# Patient Record
Sex: MALE | Race: WHITE | NOT HISPANIC OR LATINO | Employment: FULL TIME | URBAN - METROPOLITAN AREA
[De-identification: names, ages, dates, MRNs, and addresses within clinical notes are randomized per-mention and may not be internally consistent; named-entity substitution may affect disease eponyms.]

---

## 2020-02-04 ENCOUNTER — OFFICE VISIT (OUTPATIENT)
Dept: FAMILY MEDICINE CLINIC | Facility: CLINIC | Age: 25
End: 2020-02-04
Payer: COMMERCIAL

## 2020-02-04 VITALS
WEIGHT: 132 LBS | TEMPERATURE: 99.1 F | DIASTOLIC BLOOD PRESSURE: 62 MMHG | RESPIRATION RATE: 16 BRPM | SYSTOLIC BLOOD PRESSURE: 100 MMHG | HEART RATE: 76 BPM | OXYGEN SATURATION: 98 % | HEIGHT: 68 IN | BODY MASS INDEX: 20 KG/M2

## 2020-02-04 DIAGNOSIS — Z11.4 SCREENING FOR HIV (HUMAN IMMUNODEFICIENCY VIRUS): ICD-10-CM

## 2020-02-04 DIAGNOSIS — Z00.00 ANNUAL PHYSICAL EXAM: Primary | ICD-10-CM

## 2020-02-04 DIAGNOSIS — Z23 ENCOUNTER FOR IMMUNIZATION: ICD-10-CM

## 2020-02-04 PROCEDURE — 90471 IMMUNIZATION ADMIN: CPT

## 2020-02-04 PROCEDURE — 90472 IMMUNIZATION ADMIN EACH ADD: CPT

## 2020-02-04 PROCEDURE — 90651 9VHPV VACCINE 2/3 DOSE IM: CPT

## 2020-02-04 PROCEDURE — 3008F BODY MASS INDEX DOCD: CPT | Performed by: FAMILY MEDICINE

## 2020-02-04 PROCEDURE — 90715 TDAP VACCINE 7 YRS/> IM: CPT

## 2020-02-04 PROCEDURE — 99385 PREV VISIT NEW AGE 18-39: CPT | Performed by: FAMILY MEDICINE

## 2020-02-04 RX ORDER — MULTIVITAMIN
1 TABLET ORAL DAILY
COMMUNITY

## 2020-02-04 NOTE — ASSESSMENT & PLAN NOTE
Normal physical exam    Screen for hyperlipidemia as he has had 2 uncles that passed away from Mis  Get fasting lipids  Given tdap and first HPV shot today  Return for nurse visit in 3 months for second and will get 3rd at next annual visit

## 2020-02-04 NOTE — PATIENT INSTRUCTIONS

## 2020-02-04 NOTE — PROGRESS NOTES
ADULT ANNUAL PHYSICAL  [facility]    NAME: Tali Lazo  AGE: 21 y o  SEX: male  : 1995     DATE: 2020     Assessment and Plan:     Problem List Items Addressed This Visit        Other    Annual physical exam - Primary     Normal physical exam    Screen for hyperlipidemia as he has had 2 uncles that passed away from Mis  Get fasting lipids  Given tdap and first HPV shot today  Return for nurse visit in 3 months for second and will get 3rd at next annual visit  Relevant Orders    Lipid panel    Basic metabolic panel      Other Visit Diagnoses     Screening for HIV (human immunodeficiency virus)        Relevant Orders    HIV 1/2 AG-AB combo    Encounter for immunization        Relevant Orders    TDAP VACCINE GREATER THAN OR EQUAL TO 6YO IM    HPV VACCINE 9 VALENT IM          Immunizations and preventive care screenings were discussed with patient today  Appropriate education was printed on patient's after visit summary  Given tdap and hpv #1 today  Counseling:  Alcohol/drug use: discussed moderation in alcohol intake, the recommendations for healthy alcohol use, and avoidance of illicit drug use  Dental Health: discussed importance of regular tooth brushing, flossing, and dental visits  Sexual health: discussed sexually transmitted diseases, partner selection, use of condoms, avoidance of unintended pregnancy, and contraceptive alternatives  · Exercise: the importance of regular exercise/physical activity was discussed  Recommend exercise 3-5 times per week for at least 30 minutes  Return in 1 year (on 2021)  Chief Complaint:     Chief Complaint   Patient presents with    Physical Exam      History of Present Illness:     Adult Annual Physical   Patient here for a comprehensive physical exam  The patient reports no problems      Diet and Physical Activity  · Diet/Nutrition: well balanced diet, frequent junk food and consuming 3-5 servings of fruits/vegetables daily  · Exercise: moderate cardiovascular exercise  Depression Screening  PHQ-9 Depression Screening    PHQ-9:    Frequency of the following problems over the past two weeks:       Little interest or pleasure in doing things:  0 - not at all  Feeling down, depressed, or hopeless:  0 - not at all  PHQ-2 Score:  0       General Health  · Sleep: sleeps well  · Vision: goes for regular eye exams  · Dental: regular dental visits   Health  · History of STDs?: no      Review of Systems:     Review of Systems   Constitutional: Negative for activity change, appetite change, fever and unexpected weight change  HENT: Negative for ear pain, postnasal drip and rhinorrhea  Eyes: Negative for photophobia and pain  Respiratory: Negative for cough, shortness of breath and wheezing  Cardiovascular: Negative for chest pain, palpitations and leg swelling  Gastrointestinal: Negative for abdominal pain, blood in stool, nausea and vomiting  Endocrine: Negative for polydipsia and polyuria  Genitourinary: Negative for difficulty urinating, hematuria and urgency  Musculoskeletal: Negative for myalgias  Skin: Negative for rash  Neurological: Negative for dizziness  Psychiatric/Behavioral: Negative for confusion and sleep disturbance  Past Medical History:     Past Medical History:   Diagnosis Date    Broken bone     collar       Past Surgical History:     History reviewed  No pertinent surgical history     Social History:     Social History     Socioeconomic History    Marital status: Single     Spouse name: None    Number of children: None    Years of education: None    Highest education level: None   Occupational History    None   Social Needs    Financial resource strain: Not hard at all   Lisandro-Patricio insecurity:     Worry: Never true     Inability: Never true   [employer] needs:     Medical: No     Non-medical: No   Tobacco Use    Smoking status: Never Smoker    Smokeless tobacco: Never Used   Substance and Sexual Activity    Alcohol use: Yes     Comment: Social    Drug use: Never    Sexual activity: None   Lifestyle    Physical activity:     Days per week: 3 days     Minutes per session: 60 min    Stress: Not at all   Relationships    Social connections:     Talks on phone: Patient refused     Gets together: Patient refused     Attends Gnosticist service: Patient refused     Active member of club or organization: Patient refused     Attends meetings of clubs or organizations: Patient refused     Relationship status: Patient refused    Intimate partner violence:     Fear of current or ex partner: No     Emotionally abused: No     Physically abused: No     Forced sexual activity: No   Other Topics Concern    None   Social History Narrative    None      Family History:     Family History   Problem Relation Age of Onset    Diabetes Paternal Grandmother     Asthma Paternal Grandmother     Asthma Paternal Aunt       Current Medications:     Current Outpatient Medications   Medication Sig Dispense Refill    Multiple Vitamin (MULTIVITAMIN) tablet Take 1 tablet by mouth daily       No current facility-administered medications for this visit  Allergies:     No Known Allergies   Physical Exam:     /62 (BP Location: Left arm, Patient Position: Sitting, Cuff Size: Adult)   Pulse 76   Temp 99 1 °F (37 3 °C) (Tympanic)   Resp 16   Ht 5' 8" (1 727 m)   Wt 59 9 kg (132 lb)   SpO2 98%   BMI 20 07 kg/m²     Physical Exam   Constitutional: He is oriented to person, place, and time  He appears well-developed and well-nourished  HENT:   Head: Normocephalic and atraumatic  Right Ear: External ear normal    Left Ear: External ear normal    Mouth/Throat: No oropharyngeal exudate  Eyes: Pupils are equal, round, and reactive to light  Conjunctivae and EOM are normal    Neck: Normal range of motion  Neck supple     Cardiovascular: Normal rate, regular rhythm and normal heart sounds  Exam reveals no gallop and no friction rub  No murmur heard  Pulmonary/Chest: Effort normal and breath sounds normal  No respiratory distress  He has no wheezes  He has no rales  He exhibits no tenderness  Abdominal: Soft  Bowel sounds are normal  He exhibits no distension and no mass  There is no tenderness  There is no rebound  Musculoskeletal: Normal range of motion  He exhibits no edema  Neurological: He is alert and oriented to person, place, and time  Skin: Skin is warm and dry  Psychiatric: He has a normal mood and affect         Parminder Santos MD   [address] [place] [address]

## 2020-05-04 ENCOUNTER — CLINICAL SUPPORT (OUTPATIENT)
Dept: FAMILY MEDICINE CLINIC | Facility: CLINIC | Age: 25
End: 2020-05-04
Payer: COMMERCIAL

## 2020-05-04 DIAGNOSIS — Z23 NEED FOR HPV VACCINATION: Primary | ICD-10-CM

## 2020-05-04 PROCEDURE — 90651 9VHPV VACCINE 2/3 DOSE IM: CPT

## 2020-05-04 PROCEDURE — 90471 IMMUNIZATION ADMIN: CPT

## 2020-05-05 ENCOUNTER — APPOINTMENT (OUTPATIENT)
Dept: LAB | Facility: HOSPITAL | Age: 25
End: 2020-05-05
Payer: COMMERCIAL

## 2020-05-05 DIAGNOSIS — Z11.4 SCREENING FOR HIV (HUMAN IMMUNODEFICIENCY VIRUS): ICD-10-CM

## 2020-05-05 DIAGNOSIS — Z00.00 ANNUAL PHYSICAL EXAM: ICD-10-CM

## 2020-05-05 LAB
ANION GAP SERPL CALCULATED.3IONS-SCNC: 5 MMOL/L (ref 4–13)
BUN SERPL-MCNC: 11 MG/DL (ref 5–25)
CALCIUM SERPL-MCNC: 9 MG/DL (ref 8.3–10.1)
CHLORIDE SERPL-SCNC: 106 MMOL/L (ref 100–108)
CHOLEST SERPL-MCNC: 161 MG/DL (ref 50–200)
CO2 SERPL-SCNC: 29 MMOL/L (ref 21–32)
CREAT SERPL-MCNC: 0.91 MG/DL (ref 0.6–1.3)
GFR SERPL CREATININE-BSD FRML MDRD: 117 ML/MIN/1.73SQ M
GLUCOSE P FAST SERPL-MCNC: 91 MG/DL (ref 65–99)
HDLC SERPL-MCNC: 84 MG/DL
LDLC SERPL CALC-MCNC: 66 MG/DL (ref 0–100)
NONHDLC SERPL-MCNC: 77 MG/DL
POTASSIUM SERPL-SCNC: 3.8 MMOL/L (ref 3.5–5.3)
SODIUM SERPL-SCNC: 140 MMOL/L (ref 136–145)
TRIGL SERPL-MCNC: 56 MG/DL

## 2020-05-05 PROCEDURE — 36415 COLL VENOUS BLD VENIPUNCTURE: CPT

## 2020-05-05 PROCEDURE — 87389 HIV-1 AG W/HIV-1&-2 AB AG IA: CPT

## 2020-05-05 PROCEDURE — 80048 BASIC METABOLIC PNL TOTAL CA: CPT

## 2020-05-05 PROCEDURE — 80061 LIPID PANEL: CPT

## 2020-05-06 LAB — HIV 1+2 AB+HIV1 P24 AG SERPL QL IA: NORMAL

## 2021-04-05 DIAGNOSIS — Z23 ENCOUNTER FOR IMMUNIZATION: ICD-10-CM

## 2021-06-23 ENCOUNTER — OFFICE VISIT (OUTPATIENT)
Dept: FAMILY MEDICINE CLINIC | Facility: CLINIC | Age: 26
End: 2021-06-23
Payer: COMMERCIAL

## 2021-06-23 VITALS
RESPIRATION RATE: 16 BRPM | OXYGEN SATURATION: 98 % | BODY MASS INDEX: 21.1 KG/M2 | SYSTOLIC BLOOD PRESSURE: 100 MMHG | HEIGHT: 68 IN | DIASTOLIC BLOOD PRESSURE: 70 MMHG | WEIGHT: 139.2 LBS | HEART RATE: 59 BPM | TEMPERATURE: 99.8 F

## 2021-06-23 DIAGNOSIS — Z23 ENCOUNTER FOR VACCINATION: ICD-10-CM

## 2021-06-23 DIAGNOSIS — Z00.00 ANNUAL PHYSICAL EXAM: Primary | ICD-10-CM

## 2021-06-23 DIAGNOSIS — Z13.1 SCREENING FOR DIABETES MELLITUS (DM): ICD-10-CM

## 2021-06-23 DIAGNOSIS — Z13.0 SCREENING, ANEMIA, DEFICIENCY, IRON: ICD-10-CM

## 2021-06-23 DIAGNOSIS — Z11.59 NEED FOR HEPATITIS C SCREENING TEST: ICD-10-CM

## 2021-06-23 PROCEDURE — 90651 9VHPV VACCINE 2/3 DOSE IM: CPT

## 2021-06-23 PROCEDURE — 3725F SCREEN DEPRESSION PERFORMED: CPT | Performed by: FAMILY MEDICINE

## 2021-06-23 PROCEDURE — 90471 IMMUNIZATION ADMIN: CPT

## 2021-06-23 PROCEDURE — 3008F BODY MASS INDEX DOCD: CPT | Performed by: FAMILY MEDICINE

## 2021-06-23 PROCEDURE — 99395 PREV VISIT EST AGE 18-39: CPT | Performed by: FAMILY MEDICINE

## 2021-06-23 PROCEDURE — 1036F TOBACCO NON-USER: CPT | Performed by: FAMILY MEDICINE

## 2021-06-23 NOTE — PATIENT INSTRUCTIONS

## 2021-06-23 NOTE — PROGRESS NOTES
ADULT ANNUAL PHYSICAL  [facility]    NAME: Krystal Frey  AGE: 31 y o  SEX: male  : 1995     DATE: 2021     Assessment and Plan:     Problem List Items Addressed This Visit        Other    Annual physical exam - Primary     Normal physical exam    Cholesterol and fasting sugars normal    Recheck next year  Last hpv shot given today  Labs ordered for next year  Other Visit Diagnoses     Encounter for vaccination        Relevant Orders    HPV VACCINE 9 VALENT IM    Screening, anemia, deficiency, iron        Relevant Orders    CBC and differential    Screening for diabetes mellitus (DM)        Relevant Orders    Comprehensive metabolic panel    Need for hepatitis C screening test        Relevant Orders    Hepatitis C antibody          Immunizations and preventive care screenings were discussed with patient today  Appropriate education was printed on patient's after visit summary  Completed the maderna covid vaccines  Counseling:  Alcohol/drug use: discussed moderation in alcohol intake, the recommendations for healthy alcohol use, and avoidance of illicit drug use  Dental Health: discussed importance of regular tooth brushing, flossing, and dental visits  · Exercise: the importance of regular exercise/physical activity was discussed  Recommend exercise 3-5 times per week for at least 30 minutes  Return in 1 year (on 2022)  Chief Complaint:     Chief Complaint   Patient presents with    Physical Exam      History of Present Illness:     Adult Annual Physical   Patient here for a comprehensive physical exam  The patient reports no problems  Diet and Physical Activity  · Diet/Nutrition: well balanced diet  · Exercise: vigorous cardiovascular exercise  [place] about 6-7 miles every day         Depression Screening  PHQ-9 Depression Screening    PHQ-9:   Frequency of the following problems over the past two weeks:      Little interest or pleasure in doing things: 0 - not at all  Feeling down, depressed, or hopeless: 0 - not at all  PHQ-2 Score: 0       General Health  · Sleep: sleeps well  · Hearing: no issues  · Vision: wears glasses  · Dental: regular dental visits   Health  · History of STDs?: no        Review of Systems:     Review of Systems   Constitutional: Negative for activity change, appetite change, fever and unexpected weight change  HENT: Negative for ear pain, postnasal drip and rhinorrhea  Eyes: Negative for photophobia and pain  Respiratory: Negative for cough, shortness of breath and wheezing  Cardiovascular: Negative for chest pain, palpitations and leg swelling  Gastrointestinal: Negative for abdominal pain, blood in stool, nausea and vomiting  Endocrine: Negative for polydipsia and polyuria  Genitourinary: Negative for difficulty urinating, hematuria and urgency  Musculoskeletal: Negative for myalgias  Skin: Negative for rash  Neurological: Negative for dizziness  Psychiatric/Behavioral: Negative for confusion and sleep disturbance  Past Medical History:     Past Medical History:   Diagnosis Date    Broken bone     collar       Past Surgical History:     No past surgical history on file     Social History:     Social History     Socioeconomic History    Marital status: Single     Spouse name: None    Number of children: None    Years of education: None    Highest education level: None   Occupational History    None   Tobacco Use    Smoking status: Never Smoker    Smokeless tobacco: Never Used   Substance and Sexual Activity    Alcohol use: Yes     Comment: Social    Drug use: Never    Sexual activity: None   Other Topics Concern    None   Social History Narrative    None     Social Determinants of Health     Financial Resource Strain:     Difficulty of Paying Living Expenses:    Food Insecurity:     Worried About Running Out of Food in the Last Year:    [address] in the Last Year:    Transportation Needs:     Lack of Transportation (Medical):  Lack of Transportation (Non-Medical):    Physical Activity:     Days of Exercise per Week:     Minutes of Exercise per Session:    Stress:     Feeling of Stress :    Social Connections:     Frequency of Communication with Friends and Family:     Frequency of Social Gatherings with Friends and Family:     Attends Episcopal Services:     Active Member of Clubs or Organizations:     Attends Club or Organization Meetings:     Marital Status:    Intimate Partner Violence:     Fear of Current or Ex-Partner:     Emotionally Abused:     Physically Abused:     Sexually Abused:       Family History:     Family History   Problem Relation Age of Onset    Diabetes Paternal Grandmother     Asthma Paternal Grandmother     Asthma Paternal Aunt       Current Medications:     Current Outpatient Medications   Medication Sig Dispense Refill    Multiple Vitamin (MULTIVITAMIN) tablet Take 1 tablet by mouth daily       No current facility-administered medications for this visit  Allergies:     No Known Allergies   Physical Exam:     /70 (BP Location: Left arm, Patient Position: Sitting, Cuff Size: Adult)   Pulse 59   Temp 99 8 °F (37 7 °C) (Tympanic)   Resp 16   Ht 5' 8" (1 727 m)   Wt 63 1 kg (139 lb 3 2 oz)   SpO2 98%   BMI 21 17 kg/m²     Physical Exam  Constitutional:       General: He is not in acute distress  Appearance: He is well-developed  HENT:      Head: Normocephalic and atraumatic  Eyes:      General: No scleral icterus  Conjunctiva/sclera: Conjunctivae normal       Pupils: Pupils are equal, round, and reactive to light  Cardiovascular:      Rate and Rhythm: Normal rate and regular rhythm  Heart sounds: Normal heart sounds  No murmur heard  No friction rub  No gallop  Pulmonary:      Effort: Pulmonary effort is normal  No respiratory distress  Breath sounds: Normal breath sounds  No wheezing or rales  Abdominal:      General: Bowel sounds are normal  There is no distension  Palpations: Abdomen is soft  There is no mass  Tenderness: There is no abdominal tenderness  There is no guarding  Musculoskeletal:         General: No tenderness  Cervical back: Normal range of motion  Skin:     General: Skin is warm and dry  Findings: No rash  Neurological:      Mental Status: He is alert and oriented to person, place, and time  Cranial Nerves: No cranial nerve deficit  Motor: No abnormal muscle tone            Ovidio Hayes MD   [address] [place] [address]

## 2021-06-23 NOTE — ASSESSMENT & PLAN NOTE
Normal physical exam    Cholesterol and fasting sugars normal    Recheck next year  Last hpv shot given today  Labs ordered for next year

## 2022-06-03 ENCOUNTER — ANESTHESIA EVENT (OUTPATIENT)
Dept: PERIOP | Facility: HOSPITAL | Age: 27
End: 2022-06-03
Payer: COMMERCIAL

## 2022-06-03 ENCOUNTER — HOSPITAL ENCOUNTER (OUTPATIENT)
Facility: HOSPITAL | Age: 27
Setting detail: OUTPATIENT SURGERY
Discharge: HOME | End: 2022-06-04
Attending: EMERGENCY MEDICINE | Admitting: SURGERY
Payer: COMMERCIAL

## 2022-06-03 ENCOUNTER — APPOINTMENT (EMERGENCY)
Dept: RADIOLOGY | Facility: HOSPITAL | Age: 27
End: 2022-06-03
Payer: COMMERCIAL

## 2022-06-03 ENCOUNTER — ANESTHESIA (OUTPATIENT)
Dept: PERIOP | Facility: HOSPITAL | Age: 27
End: 2022-06-03
Payer: COMMERCIAL

## 2022-06-03 DIAGNOSIS — K37 APPENDICITIS: Primary | ICD-10-CM

## 2022-06-03 PROBLEM — K35.80 ACUTE APPENDICITIS: Status: ACTIVE | Noted: 2022-06-03

## 2022-06-03 LAB
ALBUMIN SERPL BCP-MCNC: 3.9 G/DL (ref 3.5–5)
ALP SERPL-CCNC: 45 U/L (ref 46–116)
ALT SERPL W P-5'-P-CCNC: 18 U/L (ref 12–78)
ANION GAP SERPL CALCULATED.3IONS-SCNC: 7 MMOL/L (ref 4–13)
AST SERPL W P-5'-P-CCNC: 11 U/L (ref 5–45)
BASOPHILS # BLD AUTO: 0.04 THOUSANDS/ΜL (ref 0–0.1)
BASOPHILS NFR BLD AUTO: 0 % (ref 0–1)
BILIRUB SERPL-MCNC: 0.9 MG/DL (ref 0.2–1)
BILIRUB UR QL STRIP: NEGATIVE
BUN SERPL-MCNC: 8 MG/DL (ref 5–25)
CALCIUM SERPL-MCNC: 8.9 MG/DL (ref 8.3–10.1)
CHLORIDE SERPL-SCNC: 101 MMOL/L (ref 100–108)
CLARITY UR: CLEAR
CO2 SERPL-SCNC: 29 MMOL/L (ref 21–32)
COLOR UR: YELLOW
CREAT SERPL-MCNC: 0.98 MG/DL (ref 0.6–1.3)
EOSINOPHIL # BLD AUTO: 0.19 THOUSAND/ΜL (ref 0–0.61)
EOSINOPHIL NFR BLD AUTO: 2 % (ref 0–6)
ERYTHROCYTE [DISTWIDTH] IN BLOOD BY AUTOMATED COUNT: 13.3 % (ref 11.6–15.1)
GFR SERPL CREATININE-BSD FRML MDRD: 105 ML/MIN/1.73SQ M
GLUCOSE SERPL-MCNC: 110 MG/DL (ref 65–140)
GLUCOSE UR STRIP-MCNC: NEGATIVE MG/DL
HCT VFR BLD AUTO: 44.2 % (ref 36.5–49.3)
HGB BLD-MCNC: 14.6 G/DL (ref 12–17)
HGB UR QL STRIP.AUTO: NEGATIVE
IMM GRANULOCYTES # BLD AUTO: 0.03 THOUSAND/UL (ref 0–0.2)
IMM GRANULOCYTES NFR BLD AUTO: 0 % (ref 0–2)
KETONES UR STRIP-MCNC: ABNORMAL MG/DL
LEUKOCYTE ESTERASE UR QL STRIP: NEGATIVE
LIPASE SERPL-CCNC: 67 U/L (ref 73–393)
LYMPHOCYTES # BLD AUTO: 1.04 THOUSANDS/ΜL (ref 0.6–4.47)
LYMPHOCYTES NFR BLD AUTO: 9 % (ref 14–44)
MCH RBC QN AUTO: 30.2 PG (ref 26.8–34.3)
MCHC RBC AUTO-ENTMCNC: 33 G/DL (ref 31.4–37.4)
MCV RBC AUTO: 91 FL (ref 82–98)
MONOCYTES # BLD AUTO: 1.23 THOUSAND/ΜL (ref 0.17–1.22)
MONOCYTES NFR BLD AUTO: 10 % (ref 4–12)
NEUTROPHILS # BLD AUTO: 9.27 THOUSANDS/ΜL (ref 1.85–7.62)
NEUTS SEG NFR BLD AUTO: 79 % (ref 43–75)
NITRITE UR QL STRIP: NEGATIVE
NRBC BLD AUTO-RTO: 0 /100 WBCS
PH UR STRIP.AUTO: 7.5 [PH] (ref 4.5–8)
PLATELET # BLD AUTO: 188 THOUSANDS/UL (ref 149–390)
PMV BLD AUTO: 9.9 FL (ref 8.9–12.7)
POTASSIUM SERPL-SCNC: 3.8 MMOL/L (ref 3.5–5.3)
PROT SERPL-MCNC: 7.3 G/DL (ref 6.4–8.2)
PROT UR STRIP-MCNC: NEGATIVE MG/DL
RBC # BLD AUTO: 4.84 MILLION/UL (ref 3.88–5.62)
SODIUM SERPL-SCNC: 137 MMOL/L (ref 136–145)
SP GR UR STRIP.AUTO: 1.01 (ref 1–1.03)
UROBILINOGEN UR QL STRIP.AUTO: 0.2 E.U./DL
WBC # BLD AUTO: 11.8 THOUSAND/UL (ref 4.31–10.16)

## 2022-06-03 PROCEDURE — 99220 PR INITIAL OBSERVATION CARE/DAY 70 MINUTES: CPT | Performed by: SURGERY

## 2022-06-03 PROCEDURE — 44970 LAPAROSCOPY APPENDECTOMY: CPT | Performed by: SURGERY

## 2022-06-03 PROCEDURE — 96365 THER/PROPH/DIAG IV INF INIT: CPT

## 2022-06-03 PROCEDURE — G1004 CDSM NDSC: HCPCS

## 2022-06-03 PROCEDURE — 88304 TISSUE EXAM BY PATHOLOGIST: CPT | Performed by: PATHOLOGY

## 2022-06-03 PROCEDURE — 74176 CT ABD & PELVIS W/O CONTRAST: CPT

## 2022-06-03 PROCEDURE — 96375 TX/PRO/DX INJ NEW DRUG ADDON: CPT

## 2022-06-03 PROCEDURE — 99285 EMERGENCY DEPT VISIT HI MDM: CPT

## 2022-06-03 PROCEDURE — 36415 COLL VENOUS BLD VENIPUNCTURE: CPT

## 2022-06-03 PROCEDURE — 81003 URINALYSIS AUTO W/O SCOPE: CPT

## 2022-06-03 PROCEDURE — 83690 ASSAY OF LIPASE: CPT

## 2022-06-03 PROCEDURE — 99285 EMERGENCY DEPT VISIT HI MDM: CPT | Performed by: PHYSICIAN ASSISTANT

## 2022-06-03 PROCEDURE — 96361 HYDRATE IV INFUSION ADD-ON: CPT

## 2022-06-03 PROCEDURE — 85025 COMPLETE CBC W/AUTO DIFF WBC: CPT

## 2022-06-03 PROCEDURE — 80053 COMPREHEN METABOLIC PANEL: CPT

## 2022-06-03 RX ORDER — ONDANSETRON 2 MG/ML
4 INJECTION INTRAMUSCULAR; INTRAVENOUS ONCE
Status: COMPLETED | OUTPATIENT
Start: 2022-06-03 | End: 2022-06-03

## 2022-06-03 RX ORDER — MAGNESIUM HYDROXIDE 1200 MG/15ML
LIQUID ORAL AS NEEDED
Status: DISCONTINUED | OUTPATIENT
Start: 2022-06-03 | End: 2022-06-03 | Stop reason: HOSPADM

## 2022-06-03 RX ORDER — ACETAMINOPHEN 325 MG/1
975 TABLET ORAL EVERY 8 HOURS SCHEDULED
Status: DISCONTINUED | OUTPATIENT
Start: 2022-06-03 | End: 2022-06-04 | Stop reason: HOSPADM

## 2022-06-03 RX ORDER — GLYCOPYRROLATE 0.2 MG/ML
INJECTION INTRAMUSCULAR; INTRAVENOUS AS NEEDED
Status: DISCONTINUED | OUTPATIENT
Start: 2022-06-03 | End: 2022-06-03

## 2022-06-03 RX ORDER — OXYCODONE HYDROCHLORIDE 5 MG/1
5 TABLET ORAL EVERY 4 HOURS PRN
Status: DISCONTINUED | OUTPATIENT
Start: 2022-06-03 | End: 2022-06-03

## 2022-06-03 RX ORDER — METRONIDAZOLE 500 MG/100ML
500 INJECTION, SOLUTION INTRAVENOUS ONCE
Status: COMPLETED | OUTPATIENT
Start: 2022-06-03 | End: 2022-06-03

## 2022-06-03 RX ORDER — METRONIDAZOLE 500 MG/100ML
500 INJECTION, SOLUTION INTRAVENOUS EVERY 8 HOURS
Status: DISCONTINUED | OUTPATIENT
Start: 2022-06-03 | End: 2022-06-03

## 2022-06-03 RX ORDER — LIDOCAINE HYDROCHLORIDE 10 MG/ML
INJECTION, SOLUTION EPIDURAL; INFILTRATION; INTRACAUDAL; PERINEURAL AS NEEDED
Status: DISCONTINUED | OUTPATIENT
Start: 2022-06-03 | End: 2022-06-03

## 2022-06-03 RX ORDER — KETOROLAC TROMETHAMINE 30 MG/ML
INJECTION, SOLUTION INTRAMUSCULAR; INTRAVENOUS AS NEEDED
Status: DISCONTINUED | OUTPATIENT
Start: 2022-06-03 | End: 2022-06-03

## 2022-06-03 RX ORDER — ONDANSETRON 2 MG/ML
4 INJECTION INTRAMUSCULAR; INTRAVENOUS EVERY 6 HOURS PRN
Status: DISCONTINUED | OUTPATIENT
Start: 2022-06-03 | End: 2022-06-03

## 2022-06-03 RX ORDER — MIDAZOLAM HYDROCHLORIDE 2 MG/2ML
INJECTION, SOLUTION INTRAMUSCULAR; INTRAVENOUS AS NEEDED
Status: DISCONTINUED | OUTPATIENT
Start: 2022-06-03 | End: 2022-06-03

## 2022-06-03 RX ORDER — ALBUTEROL SULFATE 2.5 MG/3ML
2.5 SOLUTION RESPIRATORY (INHALATION) ONCE AS NEEDED
Status: DISCONTINUED | OUTPATIENT
Start: 2022-06-03 | End: 2022-06-03 | Stop reason: HOSPADM

## 2022-06-03 RX ORDER — OXYCODONE HYDROCHLORIDE 10 MG/1
10 TABLET ORAL EVERY 4 HOURS PRN
Status: DISCONTINUED | OUTPATIENT
Start: 2022-06-03 | End: 2022-06-04 | Stop reason: HOSPADM

## 2022-06-03 RX ORDER — ONDANSETRON 2 MG/ML
4 INJECTION INTRAMUSCULAR; INTRAVENOUS ONCE AS NEEDED
Status: DISCONTINUED | OUTPATIENT
Start: 2022-06-03 | End: 2022-06-03 | Stop reason: HOSPADM

## 2022-06-03 RX ORDER — ONDANSETRON 2 MG/ML
4 INJECTION INTRAMUSCULAR; INTRAVENOUS EVERY 4 HOURS PRN
Status: DISCONTINUED | OUTPATIENT
Start: 2022-06-03 | End: 2022-06-04 | Stop reason: HOSPADM

## 2022-06-03 RX ORDER — ONDANSETRON 2 MG/ML
INJECTION INTRAMUSCULAR; INTRAVENOUS AS NEEDED
Status: DISCONTINUED | OUTPATIENT
Start: 2022-06-03 | End: 2022-06-03

## 2022-06-03 RX ORDER — SENNOSIDES 8.6 MG
2 TABLET ORAL
Status: DISCONTINUED | OUTPATIENT
Start: 2022-06-04 | End: 2022-06-04 | Stop reason: HOSPADM

## 2022-06-03 RX ORDER — CALCIUM CARBONATE 200(500)MG
1000 TABLET,CHEWABLE ORAL 3 TIMES DAILY PRN
Status: DISCONTINUED | OUTPATIENT
Start: 2022-06-03 | End: 2022-06-04 | Stop reason: HOSPADM

## 2022-06-03 RX ORDER — ENOXAPARIN SODIUM 100 MG/ML
30 INJECTION SUBCUTANEOUS DAILY
Status: DISCONTINUED | OUTPATIENT
Start: 2022-06-03 | End: 2022-06-03

## 2022-06-03 RX ORDER — ROCURONIUM BROMIDE 10 MG/ML
INJECTION, SOLUTION INTRAVENOUS AS NEEDED
Status: DISCONTINUED | OUTPATIENT
Start: 2022-06-03 | End: 2022-06-03

## 2022-06-03 RX ORDER — SODIUM CHLORIDE, SODIUM LACTATE, POTASSIUM CHLORIDE, CALCIUM CHLORIDE 600; 310; 30; 20 MG/100ML; MG/100ML; MG/100ML; MG/100ML
INJECTION, SOLUTION INTRAVENOUS CONTINUOUS PRN
Status: DISCONTINUED | OUTPATIENT
Start: 2022-06-03 | End: 2022-06-03

## 2022-06-03 RX ORDER — SODIUM CHLORIDE, SODIUM LACTATE, POTASSIUM CHLORIDE, CALCIUM CHLORIDE 600; 310; 30; 20 MG/100ML; MG/100ML; MG/100ML; MG/100ML
125 INJECTION, SOLUTION INTRAVENOUS CONTINUOUS
Status: DISCONTINUED | OUTPATIENT
Start: 2022-06-03 | End: 2022-06-04

## 2022-06-03 RX ORDER — DOCUSATE SODIUM 100 MG/1
100 CAPSULE, LIQUID FILLED ORAL 2 TIMES DAILY
Status: DISCONTINUED | OUTPATIENT
Start: 2022-06-04 | End: 2022-06-04 | Stop reason: HOSPADM

## 2022-06-03 RX ORDER — BUPIVACAINE HYDROCHLORIDE AND EPINEPHRINE 2.5; 5 MG/ML; UG/ML
INJECTION, SOLUTION EPIDURAL; INFILTRATION; INTRACAUDAL; PERINEURAL AS NEEDED
Status: DISCONTINUED | OUTPATIENT
Start: 2022-06-03 | End: 2022-06-03 | Stop reason: HOSPADM

## 2022-06-03 RX ORDER — SODIUM CHLORIDE, SODIUM GLUCONATE, SODIUM ACETATE, POTASSIUM CHLORIDE, MAGNESIUM CHLORIDE, SODIUM PHOSPHATE, DIBASIC, AND POTASSIUM PHOSPHATE .53; .5; .37; .037; .03; .012; .00082 G/100ML; G/100ML; G/100ML; G/100ML; G/100ML; G/100ML; G/100ML
125 INJECTION, SOLUTION INTRAVENOUS CONTINUOUS
Status: DISCONTINUED | OUTPATIENT
Start: 2022-06-03 | End: 2022-06-03

## 2022-06-03 RX ORDER — HYDROMORPHONE HCL/PF 1 MG/ML
0.5 SYRINGE (ML) INJECTION
Status: DISCONTINUED | OUTPATIENT
Start: 2022-06-03 | End: 2022-06-04 | Stop reason: HOSPADM

## 2022-06-03 RX ORDER — DEXAMETHASONE SODIUM PHOSPHATE 10 MG/ML
INJECTION, SOLUTION INTRAMUSCULAR; INTRAVENOUS AS NEEDED
Status: DISCONTINUED | OUTPATIENT
Start: 2022-06-03 | End: 2022-06-03

## 2022-06-03 RX ORDER — NEOSTIGMINE METHYLSULFATE 1 MG/ML
INJECTION INTRAVENOUS AS NEEDED
Status: DISCONTINUED | OUTPATIENT
Start: 2022-06-03 | End: 2022-06-03

## 2022-06-03 RX ORDER — CEFAZOLIN SODIUM 1 G/3ML
INJECTION, POWDER, FOR SOLUTION INTRAMUSCULAR; INTRAVENOUS AS NEEDED
Status: DISCONTINUED | OUTPATIENT
Start: 2022-06-03 | End: 2022-06-03

## 2022-06-03 RX ORDER — DIPHENHYDRAMINE HYDROCHLORIDE 50 MG/ML
12.5 INJECTION INTRAMUSCULAR; INTRAVENOUS ONCE AS NEEDED
Status: DISCONTINUED | OUTPATIENT
Start: 2022-06-03 | End: 2022-06-03 | Stop reason: HOSPADM

## 2022-06-03 RX ORDER — CEFOXITIN SODIUM 2 G/50ML
2000 INJECTION, SOLUTION INTRAVENOUS EVERY 6 HOURS
Status: DISCONTINUED | OUTPATIENT
Start: 2022-06-03 | End: 2022-06-03

## 2022-06-03 RX ORDER — DEXMEDETOMIDINE HYDROCHLORIDE 100 UG/ML
INJECTION, SOLUTION INTRAVENOUS AS NEEDED
Status: DISCONTINUED | OUTPATIENT
Start: 2022-06-03 | End: 2022-06-03

## 2022-06-03 RX ORDER — PROPOFOL 10 MG/ML
INJECTION, EMULSION INTRAVENOUS AS NEEDED
Status: DISCONTINUED | OUTPATIENT
Start: 2022-06-03 | End: 2022-06-03

## 2022-06-03 RX ORDER — FENTANYL CITRATE/PF 50 MCG/ML
50 SYRINGE (ML) INJECTION
Status: DISCONTINUED | OUTPATIENT
Start: 2022-06-03 | End: 2022-06-03 | Stop reason: HOSPADM

## 2022-06-03 RX ORDER — ENOXAPARIN SODIUM 100 MG/ML
40 INJECTION SUBCUTANEOUS DAILY
Status: DISCONTINUED | OUTPATIENT
Start: 2022-06-03 | End: 2022-06-04 | Stop reason: HOSPADM

## 2022-06-03 RX ORDER — FENTANYL CITRATE 50 UG/ML
INJECTION, SOLUTION INTRAMUSCULAR; INTRAVENOUS AS NEEDED
Status: DISCONTINUED | OUTPATIENT
Start: 2022-06-03 | End: 2022-06-03

## 2022-06-03 RX ORDER — HYDROMORPHONE HCL/PF 1 MG/ML
0.5 SYRINGE (ML) INJECTION
Status: DISCONTINUED | OUTPATIENT
Start: 2022-06-03 | End: 2022-06-03 | Stop reason: HOSPADM

## 2022-06-03 RX ORDER — OXYCODONE HYDROCHLORIDE 5 MG/1
5 TABLET ORAL EVERY 4 HOURS PRN
Status: DISCONTINUED | OUTPATIENT
Start: 2022-06-03 | End: 2022-06-04 | Stop reason: HOSPADM

## 2022-06-03 RX ORDER — METHOCARBAMOL 750 MG/1
750 TABLET, FILM COATED ORAL EVERY 6 HOURS SCHEDULED
Status: DISCONTINUED | OUTPATIENT
Start: 2022-06-03 | End: 2022-06-04 | Stop reason: HOSPADM

## 2022-06-03 RX ADMIN — PROPOFOL 250 MG: 10 INJECTION, EMULSION INTRAVENOUS at 15:50

## 2022-06-03 RX ADMIN — ROCURONIUM BROMIDE 50 MG: 50 INJECTION INTRAVENOUS at 15:50

## 2022-06-03 RX ADMIN — FENTANYL CITRATE 50 MCG: 50 INJECTION INTRAMUSCULAR; INTRAVENOUS at 16:04

## 2022-06-03 RX ADMIN — DEXMEDETOMIDINE HCL 4 MCG: 100 INJECTION INTRAVENOUS at 16:16

## 2022-06-03 RX ADMIN — ENOXAPARIN SODIUM 40 MG: 40 INJECTION SUBCUTANEOUS at 21:24

## 2022-06-03 RX ADMIN — ACETAMINOPHEN 975 MG: 325 TABLET, FILM COATED ORAL at 21:23

## 2022-06-03 RX ADMIN — SODIUM CHLORIDE, SODIUM LACTATE, POTASSIUM CHLORIDE, AND CALCIUM CHLORIDE: .6; .31; .03; .02 INJECTION, SOLUTION INTRAVENOUS at 16:36

## 2022-06-03 RX ADMIN — NEOSTIGMINE METHYLSULFATE 4 MG: 1 INJECTION INTRAVENOUS at 17:19

## 2022-06-03 RX ADMIN — SODIUM CHLORIDE, SODIUM GLUCONATE, SODIUM ACETATE, POTASSIUM CHLORIDE, MAGNESIUM CHLORIDE, SODIUM PHOSPHATE, DIBASIC, AND POTASSIUM PHOSPHATE 125 ML/HR: .53; .5; .37; .037; .03; .012; .00082 INJECTION, SOLUTION INTRAVENOUS at 11:58

## 2022-06-03 RX ADMIN — DEXAMETHASONE SODIUM PHOSPHATE 10 MG: 10 INJECTION, SOLUTION INTRAMUSCULAR; INTRAVENOUS at 16:02

## 2022-06-03 RX ADMIN — METHOCARBAMOL TABLETS 750 MG: 750 TABLET, COATED ORAL at 11:23

## 2022-06-03 RX ADMIN — LIDOCAINE HYDROCHLORIDE 50 MG: 10 INJECTION, SOLUTION EPIDURAL; INFILTRATION; INTRACAUDAL; PERINEURAL at 15:50

## 2022-06-03 RX ADMIN — DEXTROSE 1000 MG: 50 INJECTION, SOLUTION INTRAVENOUS at 10:54

## 2022-06-03 RX ADMIN — METHOCARBAMOL TABLETS 750 MG: 750 TABLET, COATED ORAL at 21:30

## 2022-06-03 RX ADMIN — MIDAZOLAM 2 MG: 1 INJECTION INTRAMUSCULAR; INTRAVENOUS at 15:40

## 2022-06-03 RX ADMIN — KETOROLAC TROMETHAMINE 30 MG: 30 INJECTION, SOLUTION INTRAMUSCULAR at 17:12

## 2022-06-03 RX ADMIN — SODIUM CHLORIDE 1000 ML: 0.9 INJECTION, SOLUTION INTRAVENOUS at 08:35

## 2022-06-03 RX ADMIN — CEFAZOLIN SODIUM 2000 MG: 1 INJECTION, POWDER, FOR SOLUTION INTRAMUSCULAR; INTRAVENOUS at 15:52

## 2022-06-03 RX ADMIN — METRONIDAZOLE 500 MG: 500 INJECTION, SOLUTION INTRAVENOUS at 11:20

## 2022-06-03 RX ADMIN — DEXMEDETOMIDINE HCL 4 MCG: 100 INJECTION INTRAVENOUS at 16:34

## 2022-06-03 RX ADMIN — ONDANSETRON 4 MG: 2 INJECTION INTRAMUSCULAR; INTRAVENOUS at 17:10

## 2022-06-03 RX ADMIN — FENTANYL CITRATE 50 MCG: 50 INJECTION INTRAMUSCULAR; INTRAVENOUS at 15:50

## 2022-06-03 RX ADMIN — GLYCOPYRROLATE 0.6 MG: 0.2 INJECTION, SOLUTION INTRAMUSCULAR; INTRAVENOUS at 17:19

## 2022-06-03 RX ADMIN — PROPOFOL 50 MG: 10 INJECTION, EMULSION INTRAVENOUS at 15:52

## 2022-06-03 RX ADMIN — PROPOFOL 50 MG: 10 INJECTION, EMULSION INTRAVENOUS at 17:23

## 2022-06-03 RX ADMIN — ONDANSETRON 4 MG: 2 INJECTION INTRAMUSCULAR; INTRAVENOUS at 09:21

## 2022-06-03 NOTE — ANESTHESIA POSTPROCEDURE EVALUATION
Post-Op Assessment Note    CV Status:  Stable  Pain Score: 0    Pain management: adequate     Mental Status:  Alert   Hydration Status:  Stable   PONV Controlled:  None   Airway Patency:  Patent      Post Op Vitals Reviewed: Yes      Staff: CRNA         No complications documented      BP   114/60   Temp   97 4   Pulse  87   Resp   12   SpO2   98

## 2022-06-03 NOTE — H&P
H&P - Surgery  Monika Bruno 31 y o  male MRN: [id number]  Unit/Bed#: ED 14 Encounter: [id number]    Consult / Evaluation   Model of Arrival: Self    Trauma Team: Attending Lio Licea and SKYLER Marshall  Consultants:      Assessment/Plan   Active Problems / Assessment:   Abdominal pain for 24 - 48 hours  Acute appendicitis  Nausea and dry heaves     Plan:   Admit as Observation  OR today for Exp Lap - consent obtained  Pain medication  NPO  Fluids for Hydration    History of Present Illness     Chief Complaint: Abdominal pain initially under umbilicus and now RLQ      HPI:    Monika Bruno is a 31 y o  male who presents with a hitsory of nausea and abdominal pain that started yesterday  Said he woke up, later took a nap and upon getting up started with watery diarrhea for most of the day  Has had nothing by mouth  Got progressively worse and this morning felt as though he could not even get up, intense abdominal pain, especially RLQ  Drove himself to the ER  Review of Systems   Constitutional: Positive for activity change and appetite change  HENT: Negative  Eyes: Negative  Respiratory: Negative  Cardiovascular: Negative  Gastrointestinal: Positive for abdominal pain and nausea  Endocrine: Negative  Genitourinary: Negative  Musculoskeletal: Negative  Skin: Negative  Allergic/Immunologic: Negative  Neurological: Negative  Hematological: Negative  Psychiatric/Behavioral: Negative  12-point, complete review of systems was reviewed and negative except as stated above  Historical Information     Past Medical History:   Diagnosis Date    Broken bone     collar      History reviewed  No pertinent surgical history       Social History     Tobacco Use    Smoking status: Never Smoker    Smokeless tobacco: Never Used   Substance Use Topics    Alcohol use: Yes     Comment: Social    Drug use: Never     Immunization History   Administered Date(s) Administered    H1N1, All Formulations 01/08/2010    HPV9 02/04/2020, 02/04/2020, 05/04/2020, 05/04/2020, 06/23/2021    Tdap 02/04/2020     Last Tetanus: 02/04/2020  Family History: Non-contributory  Admits to occasionally taking Melatonin to help him sleep   No Known Allergies    Objective   Initial Vitals:   Temperature: 97 5 °F (36 4 °C) (06/03/22 0802)  Pulse: 59 (06/03/22 0802)  Respirations: 18 (06/03/22 0802)  Blood Pressure: 159/83 (06/03/22 0802)    Primary Survey:   Airway:        Status: patent;        Pre-hospital Interventions: none        Hospital Interventions: none  Breathing:        Pre-hospital Interventions: none       Effort: normal       Right breath sounds: normal       Left breath sounds: normal  Circulation:        Rhythm: regular       Rate: regular   Right Pulses Left Pulses    R radial: 2+  R femoral: 2+  R pedal: 2+  R carotid: 2+  R popliteal: 2+ L radial: 2+  L femoral: 2+  L pedal: 2+  L carotid: 2+  L popliteal: 2+   Disability:        GCS: Eye: 4; Verbal: 5 Motor: 6 Total: 15       Right Pupil: round;  reactive         Left Pupil:  round;  reactive      R Motor Strength L Motor Strength    R : 5/5  R dorsiflex: 5/5  R plantarflex: 5/5 L : 5/5  L dorsiflex: 5/5  L plantarflex: 5/5        Sensory:  No sensory deficit  Exposure:       Completed: Yes      Secondary Survey:  Physical Exam  Nursing note reviewed  Constitutional:       Appearance: Normal appearance  HENT:      Head: Normocephalic  Nose: Nose normal       Mouth/Throat:      Mouth: Mucous membranes are moist       Pharynx: Oropharynx is clear  Eyes:      Extraocular Movements: Extraocular movements intact  Conjunctiva/sclera: Conjunctivae normal       Pupils: Pupils are equal, round, and reactive to light  Cardiovascular:      Rate and Rhythm: Normal rate and regular rhythm  Pulses: Normal pulses  Heart sounds: Normal heart sounds     Pulmonary:      Effort: Pulmonary effort is normal       Breath sounds: Normal breath sounds  Chest:      Chest wall: No tenderness  Abdominal:      General: Abdomen is flat  Bowel sounds are normal       Tenderness: There is abdominal tenderness  Genitourinary:     Comments: deferred  Musculoskeletal:         General: No tenderness  Normal range of motion  Cervical back: Neck supple  Skin:     General: Skin is warm and dry  Capillary Refill: Capillary refill takes less than 2 seconds  Neurological:      General: No focal deficit present  Mental Status: He is oriented to person, place, and time  Psychiatric:         Mood and Affect: Mood normal          Behavior: Behavior normal          Thought Content:  Thought content normal          Judgment: Judgment normal          Invasive Devices  Report    Peripheral Intravenous Line  Duration           Peripheral IV 06/03/22 Right Antecubital <1 day              Lab Results:    Latest Reference Range & Units 06/03/22 08:12   Sodium 136 - 145 mmol/L 137   Potassium 3 5 - 5 3 mmol/L 3 8   Chloride 100 - 108 mmol/L 101   CO2 21 - 32 mmol/L 29   Anion Gap 4 - 13 mmol/L 7   BUN 5 - 25 mg/dL 8   Creatinine 0 60 - 1 30 mg/dL 0 98 [1]   Glucose, Random 65 - 140 mg/dL 110 [2]   Calcium 8 3 - 10 1 mg/dL 8 9   AST 5 - 45 U/L 11 [3]   ALT 12 - 78 U/L 18 [4]   Alkaline Phosphatase 46 - 116 U/L 45 (L)   Total Protein 6 4 - 8 2 g/dL 7 3   Albumin 3 5 - 5 0 g/dL 3 9   TOTAL BILIRUBIN 0 20 - 1 00 mg/dL 0 90 [5]   eGFR ml/min/1 73sq m 105   Lipase 73 - 393 u/L 67 (L)   WBC 4 31 - 10 16 Thousand/uL 11 80 (H)   Red Blood Cell Count 3 88 - 5 62 Million/uL 4 84   Hemoglobin 12 0 - 17 0 g/dL 14 6   HCT 36 5 - 49 3 % 44 2   MCV 82 - 98 fL 91   MCH 26 8 - 34 3 pg 30 2   MCHC 31 4 - 37 4 g/dL 33 0   RDW 11 6 - 15 1 % 13 3   Platelet Count 422 - 390 Thousands/uL 188   MPV 8 9 - 12 7 fL 9 9   nRBC /100 WBCs 0   (L): Data is abnormally low  (H): Data is abnormally high  [1] Standardized to IDMS reference method  [2] If the patient is fasting, the ADA then defines impaired fasting glucose as > 100 mg/dL and diabetes as > or equal to 123 mg/dL  Specimen collection should occur prior to Sulfasalazine administration due to the potential for falsely depressed results  Specimen collection should occur prior to Sulfapyridine administration due to the potential for falsely elevated results  [3] Specimen collection should occur prior to Sulfasalazine administration due to the potential for falsely depressed results  [4] Specimen collection should occur prior to Sulfasalazine administration due to the potential for falsely depressed results  [5] Use of this assay is not recommended for patients undergoing treatment with eltrombopag due to the potential for falsely elevated results  Imaging Results: I have personally reviewed pertinent reports  Chest Xray(s): none   FAST exam(s): none   CT Scan(s): CT C/A? P - Findings consistent with acute appendicitis  No evidence of perforation or abscess   Additional Xray(s): none     Other Studies: none    Code Status: Level 1 - Full Code  Advance Directive and Living Will:      Power of :    POLST:    I have spent 32 minutes with patient assessing and then discussing a plan of care  Dr Kobe Araya in to assess and then describe surgical intervention  Loco Elliott

## 2022-06-03 NOTE — ED PROVIDER NOTES
History  Chief Complaint   Patient presents with    Abdominal Pain     Pt reports RLQ abd pain X 1 day with nausea and decreased appetite, increase in urinary frequency, urine is "very clear"     Patient presents to the ER for evaluation of abdominal pain x1 day  Patient states that yesterday morning he began with right lower quadrant abdominal pain  States that the pain has significantly worsened over the course the last 24 hours  States the pain is worse with coughing, movement, and retching  Patient notes associated nausea and decreased appetite  States that he feels like he may be going to the bathroom more than normal however denies any difficulty  Patient denies any abdominal trauma  Denies any prior abdominal surgeries  Patient states that he tried to take to [facility] this morning around 3:00 a m  With no relief  Denies any other medication  States that he has not eaten at all today  Patient denies any fevers, headache, dizziness, chest pain, shortness of breath, syncope, vomiting, blood in stool, dysuria, testicular pain, or any other concerning symptoms  Prior to Admission Medications   Prescriptions Last Dose Informant Patient Reported? Taking? Multiple Vitamin (MULTIVITAMIN) tablet  Self Yes No   Sig: Take 1 tablet by mouth daily      Facility-Administered Medications: None       Past Medical History:   Diagnosis Date    Broken bone     collar        History reviewed  No pertinent surgical history  Family History   Problem Relation Age of Onset    Diabetes Paternal Grandmother     Asthma Paternal Grandmother     Asthma Paternal Aunt      I have reviewed and agree with the history as documented      E-Cigarette/Vaping     E-Cigarette/Vaping Substances     Social History     Tobacco Use    Smoking status: Never Smoker    Smokeless tobacco: Never Used   Substance Use Topics    Alcohol use: Yes     Comment: Social    Drug use: Never       Review of Systems Constitutional: Positive for appetite change  Negative for chills and fever  HENT: Negative for congestion and sore throat  Respiratory: Negative for cough and shortness of breath  Cardiovascular: Negative for chest pain  Gastrointestinal: Positive for abdominal pain and nausea  Negative for diarrhea and vomiting  Genitourinary: Negative for dysuria and testicular pain  Musculoskeletal: Negative for back pain  Skin: Negative for rash  Neurological: Negative for headaches  All other systems reviewed and are negative  Physical Exam  Physical Exam  Constitutional:       General: He is not in acute distress  Appearance: He is well-developed  HENT:      Head: Normocephalic and atraumatic  Nose: Nose normal    Eyes:      Conjunctiva/sclera: Conjunctivae normal    Cardiovascular:      Rate and Rhythm: Normal rate  Heart sounds: Normal heart sounds  Pulmonary:      Effort: Pulmonary effort is normal       Breath sounds: Normal breath sounds  Abdominal:      Palpations: Abdomen is soft  Tenderness: There is abdominal tenderness in the right lower quadrant  There is guarding  Positive signs include Rovsing's sign and McBurney's sign  Comments: + peritoneal signs   Genitourinary:     Comments:  exam was performed by Dr Derik Cutler, no acute abnormality  Musculoskeletal:         General: Normal range of motion  Cervical back: Normal range of motion  Skin:     General: Skin is warm  Capillary Refill: Capillary refill takes less than 2 seconds  Neurological:      Mental Status: He is alert and oriented to person, place, and time           Vital Signs  ED Triage Vitals   Temperature Pulse Respirations Blood Pressure SpO2   06/03/22 0802 06/03/22 0802 06/03/22 0802 06/03/22 0802 06/03/22 0802   97 5 °F (36 4 °C) 59 18 159/83 99 %      Temp Source Heart Rate Source Patient Position - Orthostatic VS BP Location FiO2 (%)   06/03/22 0802 06/03/22 0802 06/03/22 8329 06/03/22 0802 --   Oral Monitor Lying Right arm       Pain Score       06/03/22 1518       3           Vitals:    06/03/22 0924 06/03/22 1057 06/03/22 1330 06/03/22 1452   BP: 125/80 126/74 134/76 114/68   Pulse: 60 62 75 57   Patient Position - Orthostatic VS: Sitting Lying Lying Lying         Visual Acuity      ED Medications  Medications   multi-electrolyte (PLASMALYTE-A/ISOLYTE-S PH 7 4) IV solution ( Intravenous Stopped 6/3/22 1636)   acetaminophen (TYLENOL) tablet 975 mg ( Oral Dose Auto Held 6/6/22 2200)   ondansetron (ZOFRAN) injection 4 mg ( Intravenous MAR Hold 6/3/22 1512)   methocarbamol (ROBAXIN) tablet 750 mg ( Oral Dose Auto Held 6/10/22 0600)   oxyCODONE (ROXICODONE) IR tablet 5 mg ( Oral MAR Hold 6/3/22 1512)   senna (SENOKOT) tablet 17 2 mg ( Oral Dose Auto Held 6/7/22 2200)   docusate sodium (COLACE) capsule 100 mg ( Oral Dose Auto Held 6/7/22 1800)   oxyCODONE (ROXICODONE) immediate release tablet 10 mg ( Oral MAR Hold 6/3/22 1512)   HYDROmorphone (DILAUDID) injection 0 5 mg ( Intravenous MAR Hold 6/3/22 1512)   calcium carbonate (TUMS) chewable tablet 1,000 mg ( Oral MAR Hold 6/3/22 1512)   enoxaparin (LOVENOX) subcutaneous injection 40 mg ( Subcutaneous Dose Auto Held 6/6/22 1500)   metroNIDAZOLE (FLAGYL) IVPB (premix) 500 mg 100 mL ( Intravenous Dose Auto Held 6/6/22 1930)   cefTRIAXone (ROCEPHIN) 1,000 mg in dextrose 5 % 50 mL IVPB ( Intravenous Dose Auto Held 6/7/22 1100)   sodium chloride 0 9 % irrigation solution (100 mL Irrigation Given 6/3/22 1615)   sterile water irrigation solution (800 mL Irrigation Given 6/3/22 1615)   sodium chloride 0 9 % bolus 1,000 mL (0 mL Intravenous Stopped 6/3/22 0910)   ondansetron (ZOFRAN) injection 4 mg (4 mg Intravenous Given 6/3/22 0921)   ceftriaxone (ROCEPHIN) 1 g/50 mL in dextrose IVPB (0 mg Intravenous Stopped 6/3/22 1120)   metroNIDAZOLE (FLAGYL) IVPB (premix) 500 mg 100 mL (0 mg Intravenous Stopped 6/3/22 1151)       Diagnostic Studies  Results Reviewed     Procedure Component Value Units Date/Time    Urine Macroscopic, POC [[id number]]  (Abnormal) Collected: 06/03/22 1200    Lab Status: Final result Specimen: Urine Updated: 06/03/22 1202     Color, UA Yellow     Clarity, UA Clear     pH, UA 7 5     Leukocytes, UA Negative     Nitrite, UA Negative     Protein, UA Negative mg/dl      Glucose, UA Negative mg/dl      Ketones, UA Trace mg/dl      Urobilinogen, UA 0 2 E U /dl      Bilirubin, UA Negative     Blood, UA Negative     Specific Gravity, UA 1 015    Narrative:      CLINITEK RESULT    Comprehensive metabolic panel [[id number]]  (Abnormal) Collected: 06/03/22 0812    Lab Status: Final result Specimen: Blood from Arm, Right Updated: 06/03/22 0837     Sodium 137 mmol/L      Potassium 3 8 mmol/L      Chloride 101 mmol/L      CO2 29 mmol/L      ANION GAP 7 mmol/L      BUN 8 mg/dL      Creatinine 0 98 mg/dL      Glucose 110 mg/dL      Calcium 8 9 mg/dL      AST 11 U/L      ALT 18 U/L      Alkaline Phosphatase 45 U/L      Total Protein 7 3 g/dL      Albumin 3 9 g/dL      Total Bilirubin 0 90 mg/dL      eGFR 105 ml/min/1 73sq m     Narrative:      [place] guidelines for Chronic Kidney Disease (CKD):     Stage 1 with normal or high GFR (GFR > 90 mL/min/1 73 square meters)    Stage 2 Mild CKD (GFR = 60-89 mL/min/1 73 square meters)    Stage 3A Moderate CKD (GFR = 45-59 mL/min/1 73 square meters)    Stage 3B Moderate CKD (GFR = 30-44 mL/min/1 73 square meters)    Stage 4 Severe CKD (GFR = 15-29 mL/min/1 73 square meters)    Stage 5 End Stage CKD (GFR <15 mL/min/1 73 square meters)  Note: GFR calculation is accurate only with a steady state creatinine    Lipase [[id number]]  (Abnormal) Collected: 06/03/22 0812    Lab Status: Final result Specimen: Blood from Arm, Right Updated: 06/03/22 0837     Lipase 67 u/L     CBC and differential [[id number]]  (Abnormal) Collected: 06/03/22 0812    Lab Status: Final result Specimen: Blood from Arm, Right Updated: 06/03/22 0819     WBC 11 80 Thousand/uL      RBC 4 84 Million/uL      Hemoglobin 14 6 g/dL      Hematocrit 44 2 %      MCV 91 fL      MCH 30 2 pg      MCHC 33 0 g/dL      RDW 13 3 %      MPV 9 9 fL      Platelets 850 Thousands/uL      nRBC 0 /100 WBCs      Neutrophils Relative 79 %      Immat GRANS % 0 %      Lymphocytes Relative 9 %      Monocytes Relative 10 %      Eosinophils Relative 2 %      Basophils Relative 0 %      Neutrophils Absolute 9 27 Thousands/µL      Immature Grans Absolute 0 03 Thousand/uL      Lymphocytes Absolute 1 04 Thousands/µL      Monocytes Absolute 1 23 Thousand/µL      Eosinophils Absolute 0 19 Thousand/µL      Basophils Absolute 0 04 Thousands/µL                  CT abdomen pelvis wo contrast   Final Result by Marietta Swain MD (06/03 1034)      Findings consistent with acute appendicitis  No evidence of perforation or abscess  I personally discussed this study with Rajni Collado on 6/3/2022 at 10:34 AM                      Workstation performed: [id number]                    Procedures  Procedures         ED Course  ED Course as of 06/03/22 1655   Fri Jun 03, 2022   0825 Blood Pressure: 159/83   0825 Temperature: 97 5 °F (36 4 °C)   0825 Pulse: 59   0825 Respirations: 18   0825 SpO2: 99 %   0825 WBC(!): 11 80   0825 Hemoglobin: 14 6   0825 Given contrast shortage, Reviewed with radiology, Dr Aline Landa, states can try CT scan with oral contrast, without IV first however if appendix not identified and still have high suspicion for appendicitis, approval to repeat CT scan with IV contrast given  1436 Dr Javier Clayton saw patient in ED for  exam  Normal  exam  Kady Inoue likely appendicitis  STates he would not wait for oral contrast at this time as to not delay possible OR   1041 CT abdomen pelvis wo contrast  IMPRESSION:     Findings consistent with acute appendicitis  No evidence of perforation or abscess     2400 [facility] surgery called, consult placed                                             MDM     Patient well appearing and nontoxic in the ER  Examination very concerning for acute appendicitis  Labs and CT ordered  CT scan shows evidence of uncomplicated appendicitis  Patient was given antibiotics in the ER and surgery was consulted  Surgery saw patient at bedside in the ED and will take to OR later today  Patient stable throughout ER stay  Disposition  Final diagnoses:   Appendicitis     Time reflects when diagnosis was documented in both MDM as applicable and the Disposition within this note     Time User Action Codes Description Comment    6/3/2022 10:42 AM Horacio Asif Appendicitis       ED Disposition     None      Follow-up Information    None         Current Discharge Medication List      CONTINUE these medications which have NOT CHANGED    Details   Multiple Vitamin (MULTIVITAMIN) tablet Take 1 tablet by mouth daily             No discharge procedures on file      PDMP Review     None          ED Provider  Electronically Signed by           Clare Gerber PA-C  06/04/22 5733

## 2022-06-03 NOTE — ED NOTES
Pt reports increased nausea, unable to give urine sample at this time     Dae Chowdhury RN  06/03/22 7223

## 2022-06-03 NOTE — PLAN OF CARE
Problem: PAIN - ADULT  Goal: Verbalizes/displays adequate comfort level or baseline comfort level  Description: Interventions:  - Encourage patient to monitor pain and request assistance  - Assess pain using appropriate pain scale  - Administer analgesics based on type and severity of pain and evaluate response  - Implement non-pharmacological measures as appropriate and evaluate response  - Consider cultural and social influences on pain and pain management  - Notify physician/advanced practitioner if interventions unsuccessful or patient reports new pain  Outcome: Progressing     Problem: INFECTION - ADULT  Goal: Absence or prevention of progression during hospitalization  Description: INTERVENTIONS:  - Assess and monitor for signs and symptoms of infection  - Monitor lab/diagnostic results  - Monitor all insertion sites, i e  indwelling lines, tubes, and drains  - Monitor endotracheal if appropriate and nasal secretions for changes in amount and color  - [place] appropriate cooling/warming therapies per order  - Administer medications as ordered  - Instruct and encourage patient and family to use good hand hygiene technique  - Identify and instruct in appropriate isolation precautions for identified infection/condition  Outcome: Progressing  Goal: Absence of fever/infection during neutropenic period  Description: INTERVENTIONS:  - Monitor WBC    Outcome: Progressing     Problem: DISCHARGE PLANNING  Goal: Discharge to home or other facility with appropriate resources  Description: INTERVENTIONS:  - Identify barriers to discharge w/patient and caregiver  - Arrange for needed discharge resources and transportation as appropriate  - Identify discharge learning needs (meds, wound care, etc )  - Arrange for interpretive services to assist at discharge as needed  - Refer to Case Management Department for coordinating discharge planning if the patient needs post-hospital services based on physician/advanced practitioner order or complex needs related to functional status, cognitive ability, or social support system  Outcome: Progressing     Problem: Knowledge Deficit  Goal: Patient/family/caregiver demonstrates understanding of disease process, treatment plan, medications, and discharge instructions  Description: Complete learning assessment and assess knowledge base    Interventions:  - Provide teaching at level of understanding  - Provide teaching via preferred learning methods  Outcome: Progressing

## 2022-06-03 NOTE — OP NOTE
OPERATIVE REPORT  PATIENT NAME: Alejandro Lewis    :  1995  MRN: [id number]  Pt Location: [facility] OR ROOM 06    SURGERY DATE: 6/3/2022    Surgeon(s) and Role:     * Babak Pena DO - Primary     * Robb Barbosa MD - Assisting     * Kirby Samson MD - Assisting    Preop Diagnosis:  Appendicitis [K37]    Post-Op Diagnosis Codes:     * Appendicitis [K37]    Procedure(s) (LRB):  APPENDECTOMY LAPAROSCOPIC (N/A)    Specimen(s):  ID Type Source Tests Collected by Time Destination   1 :  Tissue Appendix TISSUE EXAM Babak Pena DO 6/3/2022 1630        Estimated Blood Loss:   Minimal    Drains:  Urethral Catheter Latex 16 Fr  (Active)   Site Assessment Patent 22 1550   Collection Container Standard drainage bag 22 1550   Securement Method Other (Comment) 22 1550   Number of days: 0       Anesthesia Type:   General    Operative Indications:  Appendicitis [K37]    Operative Findings:  Acute uncomplicated appendicitis    Complications:   None    Procedure and Technique: This is a 31year old male who presents with acute appendicitis in need of appendectomy  All risks, benefits, and alternatives were discussed with the patient who agreed with the plan of care  Patient was identified by armband and verbal communication and brought back to the operating room  He was induced by general anesthesia via endotracheal intubation  A Jang catheter was inserted to decompress the bladder  His abdomen was prepped with ChloraPrep and draped in usual sterile fashion  A time-out was called to review the procedure and its details  Antibiotics were administered  An infraumbilical midline incision was made and carried down to fascia, which was grasped with kochers and elevated prior to being divided under direct vision  This defect was used to insert a 5 mm port, which was subsequently utilized to establish our pneumoperitoneum  The laparoscope was then inserted into the abdomen    No injuries or abnormalities were seen  Subsequently the following ports were inserted under direct visualization along with local anesthetic in the typical fashion:  A left lower quadrant 5 mm port and a 5 mm suprapubic port  The patient was placed in Trendelenburg position with left side down  The peritoneal cavity was inspected and and acute simple appendicitis was noted in the right lower quadrant  We began our dissection by following the tenia of the cecum to the base of the appendix  The appendix was easily identified  The peritoneum of the inflamed mesoappendix was divided using a combination of blunt dissection and Harmonic scalpel  The mesoappendix was identified and similarly divided  The base of the cecum was inspected and found to be intact  3 endoloops were applied to the base of the appendix and subsequently the appendix was divided sharply with laparoscopic scissors proximal to the most distal endoloop  The suction  was introduced into the abdomen to remove blood and reactive ascites from the right lower quadrant and pelvis  The mesoappendix was again exposed and hemostasis appeared excellent  The appendix was placed in a sterile endoscopic bag and removed from the LLQ incision  Specimens were sent to pathology  All ports were removed under direct vision  Skin incisions were closed using 4 0 Monocryl sutures in interrupted subcuticular fashion, followed by sterile skin glue  Patient was then woken from general anesthesia and brought to PACU in stable condition  All instrument, needle, and sponge counts were correct at the conclusion of the case  Radiofrequency scanning was negative  Dr Madyson Alvarez was present for the entirety of the operation      Patient Disposition:  PACU       SIGNATURE: Raoul Couch MD  DATE: Allison 3, 2022  TIME: 5:21 PM

## 2022-06-03 NOTE — ANESTHESIA PREPROCEDURE EVALUATION
Procedure:  APPENDECTOMY LAPAROSCOPIC (N/A Abdomen)    Relevant Problems   No relevant active problems        Physical Exam    Airway    Mallampati score: I  TM Distance: >3 FB  Neck ROM: full     Dental   No notable dental hx     Cardiovascular      Pulmonary      Other Findings        Anesthesia Plan  ASA Score- 1     Anesthesia Type- general with ASA Monitors  Additional Monitors:   Airway Plan: ETT  Plan Factors-Exercise tolerance (METS): >4 METS  Chart reviewed  Existing labs reviewed  Patient summary reviewed  Patient is not a current smoker  Obstructive sleep apnea risk education given perioperatively  Induction- intravenous and rapid sequence induction  Postoperative Plan- Plan for postoperative opioid use  Planned trial extubation    Informed Consent- Anesthetic plan and risks discussed with patient  I personally reviewed this patient with the CRNA  Discussed and agreed on the Anesthesia Plan with the CRNA  Barby Kelsey

## 2022-06-03 NOTE — ED ATTENDING ATTESTATION
6/3/2022  I, Sara Garcias MD, saw and evaluated the patient  I have discussed the patient with the resident/non-physician practitioner and agree with the resident's/non-physician practitioner's findings, Plan of Care, and MDM as documented in the resident's/non-physician practitioner's note, except where noted  All available labs and Radiology studies were reviewed  I was present for key portions of any procedure(s) performed by the resident/non-physician practitioner and I was immediately available to provide assistance  At this point I agree with the current assessment done in the Emergency Department  I have conducted an independent evaluation of this patient a history and physical is as follows:    ED Course     60-year-old male presents with abdominal pain x1 day  Patient describes pain as periumbilical migraine to right lower quadrant associated symptoms include nausea vomiting and anorexia  Patient denies any other complaints at this time denies fevers or chills  Last bowel movement was yesterday  Vital signs reviewed  Abdomen is soft with point tenderness over McBurney's point  Guarding present   exam:  Testes normal lie    Impression:  60-year-old male right lower quadrant pain history presentation concerning for appendicitis  Plan will check lab CTAP pain control antiemetics as needed  CT imaging remarkable for acute appendicitis    Surgical evaluation dispo per surgery recs      Critical Care Time  Procedures

## 2022-06-04 VITALS
WEIGHT: 130 LBS | SYSTOLIC BLOOD PRESSURE: 120 MMHG | DIASTOLIC BLOOD PRESSURE: 79 MMHG | OXYGEN SATURATION: 98 % | BODY MASS INDEX: 19.26 KG/M2 | HEIGHT: 69 IN | HEART RATE: 58 BPM | TEMPERATURE: 97.6 F | RESPIRATION RATE: 16 BRPM

## 2022-06-04 PROCEDURE — NC001 PR NO CHARGE: Performed by: SURGERY

## 2022-06-04 PROCEDURE — 99024 POSTOP FOLLOW-UP VISIT: CPT | Performed by: SURGERY

## 2022-06-04 RX ORDER — METHOCARBAMOL 750 MG/1
750 TABLET, FILM COATED ORAL EVERY 6 HOURS SCHEDULED
Qty: 25 TABLET | Refills: 0 | Status: SHIPPED | OUTPATIENT
Start: 2022-06-04 | End: 2022-06-11

## 2022-06-04 RX ORDER — IBUPROFEN 800 MG/1
800 TABLET ORAL EVERY 8 HOURS PRN
Qty: 30 TABLET | Refills: 0 | Status: SHIPPED | OUTPATIENT
Start: 2022-06-04 | End: 2022-06-18

## 2022-06-04 RX ADMIN — ACETAMINOPHEN 975 MG: 325 TABLET, FILM COATED ORAL at 06:22

## 2022-06-04 RX ADMIN — METHOCARBAMOL TABLETS 750 MG: 750 TABLET, COATED ORAL at 06:22

## 2022-06-04 NOTE — PROGRESS NOTES
Progress Note - [facility]   Author  31 y o  male MRN: [id number]  Unit/Bed#: -01 Encounter: [id number]    Assessment:  30 yo male with acute uncomplicated appendicitis, POD1 laparoscopic appendectomy  Afebrile, Stable VS on room air  Plan:  - Pain and nausea control PRN  - Regular diet  - DVT ppx  - Anticipate discharge today    Subjective/Objective     Subjective:   No acute events overnight  Pain well controlled  Denies nausea, vomiting, fever, chills, SOB, chest pain  Making urine  Normal appetite  Objective:     Blood pressure 111/67, pulse 66, temperature 97 7 °F (36 5 °C), resp  rate 17, height 5' 9" (1 753 m), weight 59 kg (130 lb), SpO2 99 %  ,Body mass index is 19 2 kg/m²  Intake/Output Summary (Last 24 hours) at 6/4/2022 6764  Last data filed at 6/3/2022 1751  Gross per 24 hour   Intake 2650 ml   Output 200 ml   Net 2450 ml       Invasive Devices  Report    Peripheral Intravenous Line  Duration           Peripheral IV 06/03/22 Right Antecubital <1 day                Physical Exam:  Gen: NAD, Comfortable  Neuro: A&O  Head: Normal Cephalic, Atraumatic  Eye: No scleral icterus  Neck: No JVD, Midline trachea  CV: Regular rate, Cap refill <2 sec  Pulm: Normal work of breathing, no respiratory distress  Abd: Soft, Non-Distended, Appropriately tender by incisions: umbilical, LLQ, suprapubic  Incisions clean, dry, intact   Ecchymosis (6 5cm x 6cm) surrounding umbilical incision   Ext: No edema, Non-tender  Skin: Warm, dry, intact    ---  Makayla Shells, M4

## 2022-06-04 NOTE — DISCHARGE INSTRUCTIONS
Acute Care Surgery Discharge Instructions    Please follow-up as instructed  Activity:  - No lifting greater than 20 pounds or strenuous physical activity or exercise for at least 2 weeks  - Walking and normal light activities are encouraged  - Normal daily activities including climbing steps are okay  - No driving until no longer using pain medications  Diet:    - You may resume your normal diet  Wound Care:  - Your incisions were closed with absorbable suture and covered with a special surgical glue  - Do NOT pick or peel the glue  It will come off on its own when the incision under it has healed in 1-2 weeks  - You may shower and let soapy water run over your incisions, then gently dab dry  Do NOT scrub  No tub baths or swimming until cleared by your surgeon   - Wash incision gently with soap and water and pat dry  - Do not apply any creams or ointments unless instructed to do so by your surgeon   - Jere Tyson may apply ice as needed (no longer than 20 minutes an hour) for the first 48 hours  - Bruising is not unusual and will go away with a little time  You may apply a warm, moist compress that may help the bruising resolve quicker  - You may remove the dressings the day after surgery (unless otherwise instructed)  Leave any skin tapes (steri-strips) on the incision(s) in place until they fall off on their own  Any new dressings are optional     Medications:    -Tylenol, Ibuprofen, and robaxin for pain  - You should continue your current medication regimen after discharge unless otherwise instructed  Please refer to your discharge medication list for further details  - Please take the pain medications as directed      Additional Instructions:  - If you have any questions or concerns after discharge please call the office   - Call office or return to ER if fever greater than 101, chills, persistent nausea/vomiting, worsening/uncontrollable pain, and/or increasing redness or purulent/foul smelling drainage from incision(s)

## 2022-06-04 NOTE — DISCHARGE SUMMARY
Discharge Summary    Rj Ram 31 y o  male MRN: [id number]    Unit/Bed#: -99 Encounter: [id number]    Admission Date: 6/3/2022     Discharge Date:/6/4/2022    Attending: Dr Awais Stevens    Consultants: None    Admitting Diagnosis: Appendicitis [K37]  Abdominal pain in male [R10 9]    Principle Diagnosis: Acute appendicitis    Secondary Diagnosis:  Past Medical History:   Diagnosis Date    Broken bone     collar      History reviewed  No pertinent surgical history  Procedures Performed: Procedure(s):  APPENDECTOMY LAPAROSCOPIC    Imaging:Procedure: CT abdomen pelvis wo contrast    Result Date: 6/3/2022  Narrative: CT ABDOMEN AND PELVIS WITHOUT IV CONTRAST INDICATION:   RLQ abdominal pain (Age >= 14y) RLQ pain, concern for appy  COMPARISON:  None  TECHNIQUE:  CT examination of the abdomen and pelvis was performed without intravenous contrast  This examination was performed without intravenous contrast in the context of the critical nationwide Omnipaque shortage  Axial, sagittal, and coronal 2D reformatted images were created from the source data and submitted for interpretation  Radiation dose length product (DLP) for this visit:  316 35 mGy-cm   This examination, like all CT scans performed in the [facility], was performed utilizing techniques to minimize radiation dose exposure, including the use of iterative  reconstruction and automated exposure control  Enteric contrast was administered  FINDINGS: ABDOMEN LOWER CHEST:  No clinically significant abnormality identified in the visualized lower chest  LIVER/BILIARY TREE:  Unremarkable  GALLBLADDER:  No calcified gallstones  No pericholecystic inflammatory change  SPLEEN:  Unremarkable  PANCREAS:  Unremarkable  ADRENAL GLANDS:  Unremarkable  KIDNEYS/URETERS:  Unremarkable  No hydronephrosis  STOMACH AND BOWEL:  Unremarkable  APPENDIX:  Dilated appendix is identified measuring 18 mm    Extensive surrounding periappendiceal inflammatory change is identified in keeping with acute appendicitis  ABDOMINOPELVIC CAVITY:  No ascites  No pneumoperitoneum  No lymphadenopathy  VESSELS:  Unremarkable for patient's age  PELVIS REPRODUCTIVE ORGANS:  Unremarkable for patient's age  URINARY BLADDER:  Unremarkable  ABDOMINAL WALL/INGUINAL REGIONS:  Unremarkable  OSSEOUS STRUCTURES:  No acute fracture or destructive osseous lesion  Impression: Findings consistent with acute appendicitis  No evidence of perforation or abscess  I personally discussed this study with Shelli Her on 6/3/2022 at 10:34 AM  Workstation performed: [id number]       Discharge Medications:  See after visit summary for reconciled discharge medications provided to patient and family  Brief HPI (per H/P): Per  Vera Andujar, "Rosalie Purcell is a 31 y o  male who presents with a hitsory of nausea and abdominal pain that started yesterday  Said he woke up, later took a nap and upon getting up started with watery diarrhea for most of the day  Has had nothing by mouth  Got progressively worse and this morning felt as though he could not even get up, intense abdominal pain, especially RLQ  Drove himself to the ER "       Hospital Course: Rosalie Purcell is a 31 y o  male who presented 6/3/2022 for above procedure  Intraoperative findings included the following from operative report:acute uncomplicated appendicitis  The patient hospital course was uncomplicated  Patient did well  On POD1 was tolerating a diet, pain was well controlled, and urinating appropriately  Patient was discharged on HD1/POD1  On the day of discharge, the patient was voiding spontaneously, ambulating at baseline, and pain was well controlled  The patient was sent home with prescriptions for pain, robaxin and ibuprofen  He understood all instructions for discharge  He was also given the names and numbers of the providers as well as instructions for follow up appointments       Condition at Discharge: good     Provisions for Follow-Up Care:  See after visit summary for information related to follow-up care and any pertinent home health orders  Disposition: See After Visit Summary for discharge disposition information  Discharge instructions/Information to patient and family:   See after visit summary for information provided to patient and family  Planned Readmission: No    Discharge Statement   I spent 15 minutes discharging the patient  This time was spent on the day of discharge  I had direct contact with the patient on the day of discharge  Additional documentation is required if more than 30 minutes were spent on discharge

## 2022-06-04 NOTE — PLAN OF CARE
Problem: PAIN - ADULT  Goal: Verbalizes/displays adequate comfort level or baseline comfort level  Description: Interventions:  - Encourage patient to monitor pain and request assistance  - Assess pain using appropriate pain scale  - Administer analgesics based on type and severity of pain and evaluate response  - Implement non-pharmacological measures as appropriate and evaluate response  - Consider cultural and social influences on pain and pain management  - Notify physician/advanced practitioner if interventions unsuccessful or patient reports new pain  Outcome: Progressing     Problem: INFECTION - ADULT  Goal: Absence or prevention of progression during hospitalization  Description: INTERVENTIONS:  - Assess and monitor for signs and symptoms of infection  - Monitor lab/diagnostic results  - Monitor all insertion sites, i e  indwelling lines, tubes, and drains  - Monitor endotracheal if appropriate and nasal secretions for changes in amount and color  - [place] appropriate cooling/warming therapies per order  - Administer medications as ordered  - Instruct and encourage patient and family to use good hand hygiene technique  - Identify and instruct in appropriate isolation precautions for identified infection/condition  Outcome: Progressing     Problem: DISCHARGE PLANNING  Goal: Discharge to home or other facility with appropriate resources  Description: INTERVENTIONS:  - Identify barriers to discharge w/patient and caregiver  - Arrange for needed discharge resources and transportation as appropriate  - Identify discharge learning needs (meds, wound care, etc )  - Arrange for interpretive services to assist at discharge as needed  - Refer to Case Management Department for coordinating discharge planning if the patient needs post-hospital services based on physician/advanced practitioner order or complex needs related to functional status, cognitive ability, or social support system  Outcome: Progressing Problem: Knowledge Deficit  Goal: Patient/family/caregiver demonstrates understanding of disease process, treatment plan, medications, and discharge instructions  Description: Complete learning assessment and assess knowledge base    Interventions:  - Provide teaching at level of understanding  - Provide teaching via preferred learning methods  Outcome: Progressing

## 2022-06-04 NOTE — QUICK NOTE
Post-Op Check - Red Surgery   Ashlie Oreilly 31 y o  male MRN: [id number]  Unit/Bed#: -01 Encounter: [id number]    Assessment:   32 yo male with acute uncomplicated appendicitis, s/p laparoscopic appendectomy today  Vitals stable on room air  Pain is adequately controlled  Patient denies nausea, vomiting, fever, chills, SOB, chest pain  Passing urine and flatus  Reports normal appetite  Plan:  - Pain and nausea control PRN  - Regular diet  -   - DVT ppx  - Anticipate discharge tomorrow     Physical Exam:   Gen: NAD, Comfortable  Neuro: A&O, No focal deficits  Head: Normal Cephalic, Atraumatic  Neck: No JVD, Midline trachea  CV: Regular rate, cap refill <2 sec  Pulm: Normal work of breathing, no respiratory distress  Abd: Soft, Minimally distended, Appropriately tender   Umbilical, LLQ, and suprapubic incisions clean, dry, intact  Ext: No edema, Non-tender  Skin: Warm, dry, intact    ---  Mariah Fernandez, M4

## 2022-06-07 ENCOUNTER — TRANSITIONAL CARE MANAGEMENT (OUTPATIENT)
Dept: FAMILY MEDICINE CLINIC | Facility: CLINIC | Age: 27
End: 2022-06-07

## 2022-06-16 ENCOUNTER — OFFICE VISIT (OUTPATIENT)
Dept: SURGERY | Facility: CLINIC | Age: 27
End: 2022-06-16

## 2022-06-16 VITALS — BODY MASS INDEX: 19.73 KG/M2 | WEIGHT: 133.2 LBS | TEMPERATURE: 97.7 F | HEIGHT: 69 IN

## 2022-06-16 DIAGNOSIS — K35.32 PERFORATED APPENDIX: ICD-10-CM

## 2022-06-16 DIAGNOSIS — Z09 POSTOP CHECK: ICD-10-CM

## 2022-06-16 PROCEDURE — 99024 POSTOP FOLLOW-UP VISIT: CPT | Performed by: SURGERY

## 2022-06-16 NOTE — PROGRESS NOTES
Office Visit - General Surgery  Ashley Dia MRN: [id number]  Encounter: [id number]    Assessment and Plan  Problem List Items Addressed This Visit    None         Chief Complaint:  Ashley Dia is a 31 y o  male who presents for Post-op (P/o appendectomy )    Subjective      Past Medical History:   Diagnosis Date    Broken bone     collar        Past Surgical History:   Procedure Laterality Date    APPENDECTOMY LAPAROSCOPIC N/A 6/3/2022    Procedure: APPENDECTOMY LAPAROSCOPIC;  Surgeon: Mimi Shirley DO;  Location: [facility];  Service: General       Family History   Problem Relation Age of Onset    Diabetes Paternal Grandmother     Asthma Paternal Grandmother     Asthma Paternal Aunt        Social History     Tobacco Use    Smoking status: Never Smoker    Smokeless tobacco: Never Used   Substance Use Topics    Alcohol use: Yes     Comment: Social    Drug use: Never        Medications  Current Outpatient Medications on File Prior to Visit   Medication Sig Dispense Refill    ibuprofen (MOTRIN) 800 mg tablet Take 1 tablet (800 mg total) by mouth every 8 (eight) hours as needed for moderate pain for up to 14 days 30 tablet 0    Multiple Vitamin (MULTIVITAMIN) tablet Take 1 tablet by mouth daily      methocarbamol (ROBAXIN) 750 mg tablet Take 1 tablet (750 mg total) by mouth every 6 (six) hours for 25 doses 25 tablet 0     No current facility-administered medications on file prior to visit         Allergies  No Known Allergies    Review of Systems    Objective  Vitals:    06/16/22 1547   Temp: 97 7 °F (36 5 °C)       Physical Exam

## 2022-06-27 ENCOUNTER — OFFICE VISIT (OUTPATIENT)
Dept: FAMILY MEDICINE CLINIC | Facility: CLINIC | Age: 27
End: 2022-06-27
Payer: COMMERCIAL

## 2022-06-27 VITALS
WEIGHT: 136.6 LBS | HEIGHT: 69 IN | DIASTOLIC BLOOD PRESSURE: 80 MMHG | OXYGEN SATURATION: 99 % | HEART RATE: 62 BPM | RESPIRATION RATE: 16 BRPM | SYSTOLIC BLOOD PRESSURE: 110 MMHG | BODY MASS INDEX: 20.23 KG/M2 | TEMPERATURE: 98.9 F

## 2022-06-27 DIAGNOSIS — Z13.1 SCREENING FOR DIABETES MELLITUS (DM): ICD-10-CM

## 2022-06-27 DIAGNOSIS — Z13.0 SCREENING FOR DEFICIENCY ANEMIA: ICD-10-CM

## 2022-06-27 DIAGNOSIS — Z00.00 ANNUAL PHYSICAL EXAM: Primary | ICD-10-CM

## 2022-06-27 DIAGNOSIS — Z11.59 NEED FOR HEPATITIS C SCREENING TEST: ICD-10-CM

## 2022-06-27 PROBLEM — K35.80 ACUTE APPENDICITIS: Status: RESOLVED | Noted: 2022-06-03 | Resolved: 2022-06-27

## 2022-06-27 PROCEDURE — 1036F TOBACCO NON-USER: CPT | Performed by: FAMILY MEDICINE

## 2022-06-27 PROCEDURE — 99395 PREV VISIT EST AGE 18-39: CPT | Performed by: FAMILY MEDICINE

## 2022-06-27 PROCEDURE — 3725F SCREEN DEPRESSION PERFORMED: CPT | Performed by: FAMILY MEDICINE

## 2022-06-27 PROCEDURE — 3008F BODY MASS INDEX DOCD: CPT | Performed by: FAMILY MEDICINE

## 2022-06-27 NOTE — PROGRESS NOTES
ADULT ANNUAL PHYSICAL  [facility]    NAME: Thomas Sherwood  AGE: 31 y o  SEX: male  : 1995     DATE: 2022     Assessment and Plan:     Problem List Items Addressed This Visit        Other    Annual physical exam - Primary     Normal exam    Lipids normal at 2020  Screening labs ordered for next year  UTD on vaccines  No FH of colon cancer  Other Visit Diagnoses     Screening for deficiency anemia        Relevant Orders    CBC and differential    Screening for diabetes mellitus (DM)        Relevant Orders    Comprehensive metabolic panel    Need for hepatitis C screening test        Relevant Orders    Hepatitis C antibody          Immunizations and preventive care screenings were discussed with patient today  Appropriate education was printed on patient's after visit summary  Vaccines UTD  Counseling:  Alcohol/drug use: discussed moderation in alcohol intake, the recommendations for healthy alcohol use, and avoidance of illicit drug use  Dental Health: discussed importance of regular tooth brushing, flossing, and dental visits  Exercise: the importance of regular exercise/physical activity was discussed  Recommend exercise 3-5 times per week for at least 30 minutes  Return in about 1 year (around 2023)  Chief Complaint:     Chief Complaint   Patient presents with    Physical Exam      History of Present Illness:     Adult Annual Physical   Patient here for a comprehensive physical exam  The patient reports no problems  Diet and Physical Activity  Diet/Nutrition: well balanced diet  Exercise: vigorous cardiovascular exercise        Depression Screening  PHQ-2/9 Depression Screening    Little interest or pleasure in doing things: 0 - not at all  Feeling down, depressed, or hopeless: 1 - several days  PHQ-2 Score: 1  PHQ-2 Interpretation: Negative depression screen       General Health  Sleep: sleeps well    Hearing: no issues  Vision: goes for regular eye exams and wears glasses  Dental: regular dental visits   Health  History of STDs?: no      Review of Systems:     Review of Systems   Constitutional: Negative for activity change, appetite change, fever and unexpected weight change  HENT: Negative for ear pain, postnasal drip and rhinorrhea  Eyes: Negative for photophobia and pain  Respiratory: Negative for cough, shortness of breath and wheezing  Cardiovascular: Negative for chest pain, palpitations and leg swelling  Gastrointestinal: Negative for abdominal pain, blood in stool, nausea and vomiting  Endocrine: Negative for polydipsia and polyuria  Genitourinary: Negative for difficulty urinating, hematuria and urgency  Musculoskeletal: Negative for myalgias  Skin: Negative for rash  Neurological: Negative for dizziness  Psychiatric/Behavioral: Negative for confusion and sleep disturbance        Past Medical History:     Past Medical History:   Diagnosis Date    Broken bone     collar       Past Surgical History:     Past Surgical History:   Procedure Laterality Date    APPENDECTOMY LAPAROSCOPIC N/A 6/3/2022    Procedure: APPENDECTOMY LAPAROSCOPIC;  Surgeon: Savi Hernandes DO;  Location: [facility];  Service: General      Social History:     Social History     Socioeconomic History    Marital status: Single     Spouse name: None    Number of children: None    Years of education: None    Highest education level: None   Occupational History    None   Tobacco Use    Smoking status: Never Smoker    Smokeless tobacco: Never Used   Substance and Sexual Activity    Alcohol use: Yes     Comment: Social    Drug use: Never    Sexual activity: None   Other Topics Concern    None   Social History Narrative    None     Social Determinants of Health     Financial Resource Strain: Not on file   Food Insecurity: Not on file   Transportation Needs: Not on file Physical Activity: Not on file   Stress: Not on file   Social Connections: Not on file   Intimate Partner Violence: Not on file   Housing Stability: Not on file      Family History:     Family History   Problem Relation Age of Onset    Diabetes Paternal Grandmother     Asthma Paternal Grandmother     Asthma Paternal Aunt       Current Medications:     Current Outpatient Medications   Medication Sig Dispense Refill    Multiple Vitamin (MULTIVITAMIN) tablet Take 1 tablet by mouth daily      ibuprofen (MOTRIN) 800 mg tablet Take 1 tablet (800 mg total) by mouth every 8 (eight) hours as needed for moderate pain for up to 14 days 30 tablet 0    methocarbamol (ROBAXIN) 750 mg tablet Take 1 tablet (750 mg total) by mouth every 6 (six) hours for 25 doses 25 tablet 0     No current facility-administered medications for this visit  Allergies:     No Known Allergies   Physical Exam:     /80 (BP Location: Left arm, Patient Position: Sitting, Cuff Size: Standard)   Pulse 62   Temp 98 9 °F (37 2 °C) (Tympanic)   Resp 16   Ht 5' 9" (1 753 m)   Wt 62 kg (136 lb 9 6 oz)   SpO2 99%   BMI 20 17 kg/m²     Physical Exam  Constitutional:       General: He is not in acute distress  Appearance: He is well-developed  HENT:      Head: Normocephalic and atraumatic  Eyes:      General: No scleral icterus  Conjunctiva/sclera: Conjunctivae normal       Pupils: Pupils are equal, round, and reactive to light  Cardiovascular:      Rate and Rhythm: Normal rate and regular rhythm  Heart sounds: Normal heart sounds  No murmur heard  No friction rub  No gallop  Pulmonary:      Effort: Pulmonary effort is normal  No respiratory distress  Breath sounds: Normal breath sounds  No wheezing or rales  Abdominal:      General: Bowel sounds are normal  There is no distension  Palpations: Abdomen is soft  There is no mass  Tenderness: There is no abdominal tenderness  There is no guarding  Comments: Surgical sites clean and well healed    Musculoskeletal:         General: No tenderness  Cervical back: Normal range of motion  Skin:     General: Skin is warm and dry  Findings: No rash  Neurological:      Mental Status: He is alert and oriented to person, place, and time  Cranial Nerves: No cranial nerve deficit  Motor: No abnormal muscle tone            Huong Locke MD   [address] [place] [address]

## 2022-06-27 NOTE — PATIENT INSTRUCTIONS

## 2022-06-27 NOTE — ASSESSMENT & PLAN NOTE
Normal exam    Lipids normal at 2020  Screening labs ordered for next year  UTD on vaccines  No FH of colon cancer

## 2023-06-12 ENCOUNTER — HOSPITAL ENCOUNTER (EMERGENCY)
Facility: HOSPITAL | Age: 28
Discharge: HOME | End: 2023-06-12
Attending: EMERGENCY MEDICINE
Payer: COMMERCIAL

## 2023-06-12 VITALS
DIASTOLIC BLOOD PRESSURE: 87 MMHG | OXYGEN SATURATION: 98 % | SYSTOLIC BLOOD PRESSURE: 130 MMHG | TEMPERATURE: 97.8 F | RESPIRATION RATE: 18 BRPM | HEART RATE: 98 BPM

## 2023-06-12 DIAGNOSIS — R03.0 ELEVATED BLOOD PRESSURE READING: ICD-10-CM

## 2023-06-12 DIAGNOSIS — F10.929 ACUTE ALCOHOL INTOXICATION (HCC): Primary | ICD-10-CM

## 2023-06-12 PROCEDURE — 99284 EMERGENCY DEPT VISIT MOD MDM: CPT

## 2023-06-12 NOTE — ED PROVIDER NOTES
History  Chief Complaint   Patient presents with   • Alcohol Intoxication     Presents to ER with EMS and police after being found sleeping [place] bridge +etoh patient has no complaints at this time      Patient is a 50-year-old male, with no pertinent medical history, who presents to the ED today, via EMS and police, after being [place] bridge. Patient states that he does not make a habit of drinking regularly but that he was socially drinking tonight and drank an unknown quantity of beer. Patient denies drinking any "funny" alcohols or alcohol is not from a alcohol distributor and he also denies any drug use. Patient denies any known trauma, headache, chest pain, dyspnea, abdominal pain, weakness, numbness, vision change, dysphagia, difficulty walking, other concerns at this time. Patient states that he feels well and that he wants to go home. Patient believes that he tried to walk home and tired out. Prior to Admission Medications   Prescriptions Last Dose Informant Patient Reported? Taking?    Multiple Vitamin (MULTIVITAMIN) tablet  Self Yes No   Sig: Take 1 tablet by mouth daily   ibuprofen (MOTRIN) 800 mg tablet   No No   Sig: Take 1 tablet (800 mg total) by mouth every 8 (eight) hours as needed for moderate pain for up to 14 days   methocarbamol (ROBAXIN) 750 mg tablet   No No   Sig: Take 1 tablet (750 mg total) by mouth every 6 (six) hours for 25 doses      Facility-Administered Medications: None       Past Medical History:   Diagnosis Date   • Broken bone     collar        Past Surgical History:   Procedure Laterality Date   • APPENDECTOMY LAPAROSCOPIC N/A 6/3/2022    Procedure: APPENDECTOMY LAPAROSCOPIC;  Surgeon: Patricia Braxton DO;  Location: [facility];  Service: General       Family History   Problem Relation Age of Onset   • Diabetes Paternal Grandmother    • Asthma Paternal Grandmother    • Asthma Paternal Aunt      I have reviewed and agree with the history as documented. E-Cigarette/Vaping     E-Cigarette/Vaping Substances     Social History     Tobacco Use   • Smoking status: Never   • Smokeless tobacco: Never   Substance Use Topics   • Alcohol use: Yes     Comment: Social   • Drug use: Never        Review of Systems   Constitutional: Negative for fever. HENT: Negative for trouble swallowing. Eyes: Negative for visual disturbance. Respiratory: Negative for shortness of breath. Cardiovascular: Negative for chest pain. Gastrointestinal: Negative for abdominal pain. Endocrine: Negative for polyuria. Genitourinary: Negative for dysuria. Musculoskeletal: Negative for gait problem. Skin: Negative for rash. Allergic/Immunologic: Negative for environmental allergies. Neurological: Negative for weakness and numbness. Psychiatric/Behavioral: Negative for confusion. All other systems reviewed and are negative. Physical Exam  ED Triage Vitals [06/12/23 0048]   Temperature Pulse Respirations Blood Pressure SpO2   97.8 °F (36.6 °C) 98 18 130/87 98 %      Temp Source Heart Rate Source Patient Position - Orthostatic VS BP Location FiO2 (%)   Oral Monitor Sitting Left arm --      Pain Score       --             Orthostatic Vital Signs  Vitals:    06/12/23 0048   BP: 130/87   Pulse: 98   Patient Position - Orthostatic VS: Sitting       Physical Exam  Vitals and nursing note reviewed. Constitutional:       General: He is not in acute distress. Appearance: Normal appearance. He is not ill-appearing, toxic-appearing or diaphoretic. Comments: Patient appears comfortable during my evaluation    HENT:      Head: Normocephalic and atraumatic. Right Ear: External ear normal.      Left Ear: External ear normal.      Nose: Nose normal. No rhinorrhea. Mouth/Throat:      Mouth: Mucous membranes are moist.      Pharynx: Oropharynx is clear. No oropharyngeal exudate or posterior oropharyngeal erythema. Comments: Uvula midline.  No oropharyngeal or submandibular mass/swelling  Eyes:      General: No scleral icterus. Right eye: No discharge. Left eye: No discharge. Extraocular Movements: Extraocular movements intact. Conjunctiva/sclera: Conjunctivae normal.      Pupils: Pupils are equal, round, and reactive to light. Cardiovascular:      Rate and Rhythm: Normal rate and regular rhythm. Pulses: Normal pulses. Heart sounds: Normal heart sounds. No murmur heard. No friction rub. No gallop. Comments: 2+ Radial  Pulmonary:      Effort: Pulmonary effort is normal. No respiratory distress. Breath sounds: Normal breath sounds. No stridor. No wheezing, rhonchi or rales. Abdominal:      General: Abdomen is flat. There is no distension. Palpations: Abdomen is soft. Tenderness: There is no abdominal tenderness. There is no right CVA tenderness, left CVA tenderness, guarding or rebound. Musculoskeletal:         General: No tenderness or deformity. Cervical back: Normal range of motion and neck supple. No rigidity or tenderness. No muscular tenderness. Comments: No tenderness to palpation of the bilateral shoulders, elbows, arms, thighs, knees, legs. No chest wall or pelvic tenderness to palpation     No midline C, T, L spine tenderness to palpation    Lymphadenopathy:      Cervical: No cervical adenopathy. Skin:     General: Skin is warm and dry. Capillary Refill: Capillary refill takes less than 2 seconds. Neurological:      Mental Status: He is alert. Comments: Cranial nerves 2-12 intact. 5/5 strength in all four extremities including finger extension against resistance. Sensation to light touch subjectively intact/equal in all four extremities and the face. Patient able to ambulate without difficulty. Patient is speaking clearly in complete sentences. Patient is answering appropriately and able to follow commands. No speech slurring.    Psychiatric: Mood and Affect: Mood normal.         Behavior: Behavior normal.         ED Medications  Medications - No data to display    Diagnostic Studies  Results Reviewed     None                 No orders to display         Procedures  Procedures      ED Course                                       Medical Decision Making  Patient is a 26-year-old male, with no pertinent medical history, who presents to the ED today, via EMS and police, after being [place] sleeping [place] bridge. Patient states that he does not make a habit of drinking regularly but that he was socially drinking tonight and drank an unknown quantity of beer. Patient denies drinking any "funny" alcohols or alcohol is not from a alcohol distributor and he also denies any drug use. Patient denies any known trauma, headache, chest pain, dyspnea, abdominal pain, weakness, numbness, vision change, dysphagia, difficulty walking, other concerns at this time. Patient states that he feels well and that he wants to go home. Patient believes that he tried to walk home and tired out. Patient is currently afebrile and hemodynamically stable. His physical exam is notable for no external signs of trauma, no speech slurring, ability to ambulate in a straight line, clear heart lungs, soft nontender abdomen, no tenderness to palpation throughout the body. This presentation is concerning for: Acute alcohol intoxication. Low clinical suspicion for occult trauma/coingestion at this time based on history and physical exam.  Patient attempting to call friends and family for a ride. - No language barrier.   - History obtained from patient. - There are no limitations to the history obtained. - External record review performed of previous charting was performed by me. - Patient's medication regimen reviewed. - Patient's comorbidities factored into diagnosis considerations and disposition planning.            Disposition  Final diagnoses:   Acute alcohol intoxication ([address])   Elevated blood pressure reading     Time reflects when diagnosis was documented in both MDM as applicable and the Disposition within this note     Time User Action Codes Description Comment    6/12/2023  1:03 AM [employer] A Add [F10.929] Acute alcohol intoxication ([address])     6/12/2023  1:03 AM [employer] A Add [R03.0] Elevated blood pressure reading       ED Disposition     ED Disposition   Discharge    Condition   Stable    Date/Time   Mon Jun 12, 2023  1:16 AM    Comment   Fernando Favorcecil discharge to home/self care. Follow-up Information     Follow up With Specialties Details Why [address], [place] Family Medicine Schedule an appointment as soon as possible for a visit   [phone]  [address]  [phone]            Current Discharge Medication List      CONTINUE these medications which have NOT CHANGED    Details   ibuprofen (MOTRIN) 800 mg tablet Take 1 tablet (800 mg total) by mouth every 8 (eight) hours as needed for moderate pain for up to 14 days  Qty: 30 tablet, Refills: 0    Associated Diagnoses: Appendicitis      methocarbamol (ROBAXIN) 750 mg tablet Take 1 tablet (750 mg total) by mouth every 6 (six) hours for 25 doses  Qty: 25 tablet, Refills: 0    Associated Diagnoses: Appendicitis      Multiple Vitamin (MULTIVITAMIN) tablet Take 1 tablet by mouth daily           No discharge procedures on file. PDMP Review     None           ED Provider  Attending physically available and evaluated Fernando Luongcecil. I managed the patient along with the ED Attending.     Electronically Signed by         Maria D eJesus Stewart MD  06/12/23 6520

## 2023-06-12 NOTE — DISCHARGE INSTRUCTIONS
You were evaluated in the emergency department for: alcohol intoxication. You can access your current and pending results through [facility] [place]. A radiologist will take a second look at your X-Rays, if you had any, and you will be contacted with any new findings. You should follow-up with your primary care provider, as soon as possible, for re-evaluation. If you do not have a primary care provider, I have referred you to [facility]. You will be contacted about scheduling an appointment. Their phone number is also included on this paperwork. Your workup revealed no emergent features at this time; however, many disease processes are dynamic:    Please, return to the emergency department if you experience new or worsening symptoms, fever, chest pain, shortness of breath, difficulty breathing, dizziness, abdominal pain, persistent nausea/vomiting, syncope or passing out, blood in your urine or stool, coughing up blood, leg swelling/pain, urinary retention, bowel or bladder incontinence, numbness between your legs. Additionally, your blood pressure was measured to be high. This is something that you should discuss with your primary care provider and have re-checked within one week. You should not drive until sober.

## 2023-06-12 NOTE — ED ATTENDING ATTESTATION
6/12/2023  INiko MD, saw and evaluated the patient. I have discussed the patient with the resident/non-physician practitioner and agree with the resident's/non-physician practitioner's findings, Plan of Care, and MDM as documented in the resident's/non-physician practitioner's note, except where noted. All available labs and Radiology studies were reviewed. I was present for key portions of any procedure(s) performed by the resident/non-physician practitioner and I was immediately available to provide assistance. At this point I agree with the current assessment done in the Emergency Department. I have conducted an independent evaluation of this patient a history and physical is as follows:    ED Course  ED Course as of 06/12/23 0137   Mon Jun 12, 2023   0106 Per resident h&p 25 YO M presents for evaluation of alcohol intoxication O: gait NL I/P alcohol intoxication     Emergency Department Note- Beny Umanzor 28 y.o. male MRN: [id number]    Unit/Bed#: [facility]on Encounter: [id number]    Beny Umanzor is a 28 y.o. male who presents with   Chief Complaint   Patient presents with   • Alcohol Intoxication     Presents to ER with EMS and police after being found sleeping outside under bridge +etoh patient has no complaints at this time          History of Present Illness   HPI:  Beny Umanzor is a 28 y.o. male who presents for evaluation of:  Alcohol intoxication. Patient was brought in by EMS after he was found sleeping outside underneath a bridge. Patient notes that he was drinking alcohol earlier this evening. Patient denies other illicit drug ingestions. Patient denies any intent to harm himself or others. Patient states that he just became tired and laid down on his way home from the bar. Review of Systems   Constitutional: Negative for fatigue and fever. HENT: Negative for congestion and sore throat. Respiratory: Negative for cough and shortness of breath.     Cardiovascular: Negative for chest pain and palpitations. Gastrointestinal: Negative for abdominal pain and nausea. Genitourinary: Negative for flank pain and frequency. Neurological: Negative for light-headedness and headaches. Psychiatric/Behavioral: Negative for dysphoric mood and hallucinations. All other systems reviewed and are negative. Historical Information   Past Medical History:   Diagnosis Date   • Broken bone     collar      Past Surgical History:   Procedure Laterality Date   • APPENDECTOMY LAPAROSCOPIC N/A 6/3/2022    Procedure: APPENDECTOMY LAPAROSCOPIC;  Surgeon: Selene Cruz DO;  Location: [facility];  Service: General     Social History   Social History     Substance and Sexual Activity   Alcohol Use Yes    Comment: Social     Social History     Substance and Sexual Activity   Drug Use Never     Social History     Tobacco Use   Smoking Status Never   Smokeless Tobacco Never     Family History:   Family History   Problem Relation Age of Onset   • Diabetes Paternal Grandmother    • Asthma Paternal Grandmother    • Asthma Paternal Aunt        Meds/Allergies   PTA meds:   Prior to Admission Medications   Prescriptions Last Dose Informant Patient Reported? Taking?    Multiple Vitamin (MULTIVITAMIN) tablet  Self Yes No   Sig: Take 1 tablet by mouth daily   ibuprofen (MOTRIN) 800 mg tablet   No No   Sig: Take 1 tablet (800 mg total) by mouth every 8 (eight) hours as needed for moderate pain for up to 14 days   methocarbamol (ROBAXIN) 750 mg tablet   No No   Sig: Take 1 tablet (750 mg total) by mouth every 6 (six) hours for 25 doses      Facility-Administered Medications: None     No Known Allergies    Objective   First Vitals:   Blood Pressure: 130/87 (06/12/23 0048)  Pulse: 98 (06/12/23 0048)  Temperature: 97.8 °F (36.6 °C) (06/12/23 0048)  Temp Source: Oral (06/12/23 0048)  Respirations: 18 (06/12/23 0048)  SpO2: 98 % (06/12/23 0048)    Current Vitals:   Blood Pressure: 130/87 (06/12/23 0048)  Pulse: 98 (23)  Temperature: 97.8 °F (36.6 °C) (23)  Temp Source: Oral (23)  Respirations: 18 (23)  SpO2: 98 % (23)    No intake or output data in the 24 hours ending 23 0137    Invasive Devices     None                 Physical Exam  Vitals and nursing note reviewed. Constitutional:       General: He is not in acute distress. Appearance: Normal appearance. He is well-developed. Comments: Intoxicated male, calm, cooperative. HENT:      Head: Normocephalic and atraumatic. Right Ear: External ear normal.      Left Ear: External ear normal.      Nose: Nose normal.      Mouth/Throat:      Pharynx: No oropharyngeal exudate. Eyes:      Conjunctiva/sclera: Conjunctivae normal.      Pupils: Pupils are equal, round, and reactive to light. Cardiovascular:      Rate and Rhythm: Normal rate and regular rhythm. Pulmonary:      Effort: Pulmonary effort is normal. No respiratory distress. Abdominal:      General: Abdomen is flat. There is no distension. Palpations: Abdomen is soft. Musculoskeletal:         General: No deformity. Normal range of motion. Cervical back: Normal range of motion and neck supple. Skin:     General: Skin is warm and dry. Capillary Refill: Capillary refill takes less than 2 seconds. Neurological:      General: No focal deficit present. Mental Status: He is alert and oriented to person, place, and time. Mental status is at baseline. Coordination: Coordination normal.   Psychiatric:         Mood and Affect: Mood normal.         Behavior: Behavior normal.         Thought Content: Thought content normal.         Judgment: Judgment normal.           Medical Decision Makin. Acute alcohol intoxication: Observe until sober    No results found for this or any previous visit (from the past 36 hour(s)).   No orders to display         Portions of the record may have been created with voice recognition software. Occasional wrong word or "sound a like" substitutions may have occurred due to the inherent limitations of voice recognition software. Read the chart carefully and recognize, using context, where substitutions have occurred.         Critical Care Time  Procedures

## 2023-08-22 ENCOUNTER — APPOINTMENT (OUTPATIENT)
Dept: LAB | Facility: CLINIC | Age: 28
End: 2023-08-22
Payer: COMMERCIAL

## 2023-08-22 DIAGNOSIS — Z13.0 SCREENING FOR DEFICIENCY ANEMIA: ICD-10-CM

## 2023-08-22 DIAGNOSIS — Z11.59 NEED FOR HEPATITIS C SCREENING TEST: ICD-10-CM

## 2023-08-22 DIAGNOSIS — Z13.1 SCREENING FOR DIABETES MELLITUS (DM): ICD-10-CM

## 2023-08-22 LAB
ALBUMIN SERPL BCP-MCNC: 4.5 G/DL (ref 3.5–5)
ALP SERPL-CCNC: 34 U/L (ref 34–104)
ALT SERPL W P-5'-P-CCNC: 13 U/L (ref 7–52)
ANION GAP SERPL CALCULATED.3IONS-SCNC: 6 MMOL/L
AST SERPL W P-5'-P-CCNC: 15 U/L (ref 13–39)
BASOPHILS # BLD AUTO: 0.03 THOUSANDS/ÂΜL (ref 0–0.1)
BASOPHILS NFR BLD AUTO: 1 % (ref 0–1)
BILIRUB SERPL-MCNC: 1.32 MG/DL (ref 0.2–1)
BUN SERPL-MCNC: 11 MG/DL (ref 5–25)
CALCIUM SERPL-MCNC: 9 MG/DL (ref 8.4–10.2)
CHLORIDE SERPL-SCNC: 99 MMOL/L (ref 96–108)
CO2 SERPL-SCNC: 32 MMOL/L (ref 21–32)
CREAT SERPL-MCNC: 0.85 MG/DL (ref 0.6–1.3)
EOSINOPHIL # BLD AUTO: 0.12 THOUSAND/ÂΜL (ref 0–0.61)
EOSINOPHIL NFR BLD AUTO: 3 % (ref 0–6)
ERYTHROCYTE [DISTWIDTH] IN BLOOD BY AUTOMATED COUNT: 13.1 % (ref 11.6–15.1)
GFR SERPL CREATININE-BSD FRML MDRD: 118 ML/MIN/1.73SQ M
GLUCOSE SERPL-MCNC: 119 MG/DL (ref 65–140)
HCT VFR BLD AUTO: 45.7 % (ref 36.5–49.3)
HCV AB SER QL: NORMAL
HGB BLD-MCNC: 15.2 G/DL (ref 12–17)
IMM GRANULOCYTES # BLD AUTO: 0.01 THOUSAND/UL (ref 0–0.2)
IMM GRANULOCYTES NFR BLD AUTO: 0 % (ref 0–2)
LYMPHOCYTES # BLD AUTO: 0.94 THOUSANDS/ÂΜL (ref 0.6–4.47)
LYMPHOCYTES NFR BLD AUTO: 23 % (ref 14–44)
MCH RBC QN AUTO: 29.8 PG (ref 26.8–34.3)
MCHC RBC AUTO-ENTMCNC: 33.3 G/DL (ref 31.4–37.4)
MCV RBC AUTO: 90 FL (ref 82–98)
MONOCYTES # BLD AUTO: 0.41 THOUSAND/ÂΜL (ref 0.17–1.22)
MONOCYTES NFR BLD AUTO: 10 % (ref 4–12)
NEUTROPHILS # BLD AUTO: 2.52 THOUSANDS/ÂΜL (ref 1.85–7.62)
NEUTS SEG NFR BLD AUTO: 63 % (ref 43–75)
NRBC BLD AUTO-RTO: 0 /100 WBCS
PLATELET # BLD AUTO: 211 THOUSANDS/UL (ref 149–390)
PMV BLD AUTO: 10.8 FL (ref 8.9–12.7)
POTASSIUM SERPL-SCNC: 3.7 MMOL/L (ref 3.5–5.3)
PROT SERPL-MCNC: 7.2 G/DL (ref 6.4–8.4)
RBC # BLD AUTO: 5.1 MILLION/UL (ref 3.88–5.62)
SODIUM SERPL-SCNC: 137 MMOL/L (ref 135–147)
WBC # BLD AUTO: 4.03 THOUSAND/UL (ref 4.31–10.16)

## 2023-08-22 PROCEDURE — 86803 HEPATITIS C AB TEST: CPT

## 2023-08-22 PROCEDURE — 80053 COMPREHEN METABOLIC PANEL: CPT

## 2023-08-22 PROCEDURE — 36415 COLL VENOUS BLD VENIPUNCTURE: CPT

## 2023-08-22 PROCEDURE — 85025 COMPLETE CBC W/AUTO DIFF WBC: CPT

## 2023-08-23 ENCOUNTER — OFFICE VISIT (OUTPATIENT)
Dept: FAMILY MEDICINE CLINIC | Facility: CLINIC | Age: 28
End: 2023-08-23
Payer: COMMERCIAL

## 2023-08-23 VITALS
OXYGEN SATURATION: 98 % | WEIGHT: 137 LBS | HEIGHT: 69 IN | RESPIRATION RATE: 15 BRPM | BODY MASS INDEX: 20.29 KG/M2 | SYSTOLIC BLOOD PRESSURE: 117 MMHG | TEMPERATURE: 98.1 F | DIASTOLIC BLOOD PRESSURE: 70 MMHG | HEART RATE: 60 BPM

## 2023-08-23 DIAGNOSIS — Z00.00 ANNUAL PHYSICAL EXAM: Primary | ICD-10-CM

## 2023-08-23 PROCEDURE — 99395 PREV VISIT EST AGE 18-39: CPT | Performed by: FAMILY MEDICINE

## 2023-08-23 NOTE — ASSESSMENT & PLAN NOTE
Normal exam.   Lipids normal at 2020. Screening labs reviewed. UTD on vaccines. No FH of colon cancer.

## 2023-08-23 NOTE — PATIENT INSTRUCTIONS
Try melatonin and valerian root. Magnesium 400mg can help with maintaining deep sleep. Wellness Visit for Adults   AMBULATORY CARE:   A wellness visit  is when you see your healthcare provider to get screened for health problems. Your healthcare provider will also give you advice on how to stay healthy. Write down your questions so you remember to ask them. Ask your healthcare provider how often you should have a wellness visit. What happens at a wellness visit:  Your healthcare provider will ask about your health, and your family history of health problems. This includes high blood pressure, heart disease, and cancer. He or she will ask if you have symptoms that concern you, if you smoke, and about your mood. You may also be asked about your intake of medicines, supplements, food, and alcohol. Any of the following may be done: Your weight  will be checked. Your height may also be checked so your body mass index (BMI) can be calculated. Your BMI shows if you are at a healthy weight. Your blood pressure  and heart rate will be checked. Your temperature may also be checked. Blood and urine tests  may be done. Blood tests may be done to check your cholesterol levels. Abnormal cholesterol levels increase your risk for heart disease and stroke. You may also need a blood or urine test to check for diabetes if you are at increased risk. Urine tests may be done to look for signs of an infection or kidney disease. A physical exam  includes checking your heartbeat and lungs with a stethoscope. Your healthcare provider may also check your skin to look for sun damage. Screening tests  may be recommended. A screening test is done to check for diseases that may not cause symptoms. The screening tests you may need depend on your age, gender, family history, and lifestyle habits. For example, colorectal screening may be recommended if you are 48years old or older.     Screening tests you need if you are a woman: A Pap smear  is used to screen for cervical cancer. Pap smears are usually done every 3 to 5 years depending on your age. You may need them more often if you have had abnormal Pap smear test results in the past. Ask your healthcare provider how often you should have a Pap smear. A mammogram  is an x-ray of your breasts to screen for breast cancer. Experts recommend mammograms every 2 years starting at age 48 years. You may need a mammogram at age 52 years or younger if you have an increased risk for breast cancer. Talk to your healthcare provider about when you should start having mammograms and how often you need them. Vaccines you may need:   Get an influenza vaccine  every year. The influenza vaccine protects you from the flu. Several types of viruses cause the flu. The viruses change over time, so new vaccines are made each year. Get a tetanus-diphtheria (Td) booster vaccine  every 10 years. This vaccine protects you against tetanus and diphtheria. Tetanus is a severe infection that may cause painful muscle spasms and lockjaw. Diphtheria is a severe bacterial infection that causes a thick covering in the back of your mouth and throat. Get a human papillomavirus (HPV) vaccine  if you are female and aged 23 to 32 or male 23 to 24 and never received it. This vaccine protects you from HPV infection. HPV is the most common infection spread by sexual contact. HPV may also cause vaginal, penile, and anal cancers. Get a pneumococcal vaccine  if you are aged 72 years or older. The pneumococcal vaccine is an injection given to protect you from pneumococcal disease. Pneumococcal disease is an infection caused by pneumococcal bacteria. The infection may cause pneumonia, meningitis, or an ear infection. Get a shingles vaccine  if you are 60 or older, even if you have had shingles before. The shingles vaccine is an injection to protect you from the varicella-zoster virus.  This is the same virus that causes chickenpox. Shingles is a painful rash that develops in people who had chickenpox or have been exposed to the virus. How to eat healthy:  My Plate is a model for planning healthy meals. It shows the types and amounts of foods that should go on your plate. Fruits and vegetables make up about half of your plate, and grains and protein make up the other half. A serving of dairy is included on the side of your plate. The amount of calories and serving sizes you need depends on your age, gender, weight, and height. Examples of healthy foods are listed below:  Eat a variety of vegetables  such as dark green, red, and orange vegetables. You can also include canned vegetables low in sodium (salt) and frozen vegetables without added butter or sauces. Eat a variety of fresh fruits , canned fruit in 100% juice, frozen fruit, and dried fruit. Include whole grains. At least half of the grains you eat should be whole grains. Examples include whole-wheat bread, wheat pasta, brown rice, and whole-grain cereals such as oatmeal.    Eat a variety of protein foods such as seafood (fish and shellfish), lean meat, and poultry without skin (turkey and chicken). Examples of lean meats include pork leg, shoulder, or tenderloin, and beef round, sirloin, tenderloin, and extra lean ground beef. Other protein foods include eggs and egg substitutes, beans, peas, soy products, nuts, and seeds. Choose low-fat dairy products such as skim or 1% milk or low-fat yogurt, cheese, and cottage cheese. Limit unhealthy fats  such as butter, hard margarine, and shortening. Exercise:  Exercise at least 30 minutes per day on most days of the week. Some examples of exercise include walking, biking, dancing, and swimming. You can also fit in more physical activity by taking the stairs instead of the elevator or parking farther away from stores. Include muscle strengthening activities 2 days each week.  Regular exercise provides many health benefits. It helps you manage your weight, and decreases your risk for type 2 diabetes, heart disease, stroke, and high blood pressure. Exercise can also help improve your mood. Ask your healthcare provider about the best exercise plan for you. General health and safety guidelines:   Do not smoke. Nicotine and other chemicals in cigarettes and cigars can cause lung damage. Ask your healthcare provider for information if you currently smoke and need help to quit. E-cigarettes or smokeless tobacco still contain nicotine. Talk to your healthcare provider before you use these products. Limit alcohol. A drink of alcohol is 12 ounces of beer, 5 ounces of wine, or 1½ ounces of liquor. Lose weight, if needed. Being overweight increases your risk of certain health conditions. These include heart disease, high blood pressure, type 2 diabetes, and certain types of cancer. Protect your skin. Do not sunbathe or use tanning beds. Use sunscreen with a SPF 15 or higher. Apply sunscreen at least 15 minutes before you go outside. Reapply sunscreen every 2 hours. Wear protective clothing, hats, and sunglasses when you are outside. Drive safely. Always wear your seatbelt. Make sure everyone in your car wears a seatbelt. A seatbelt can save your life if you are in an accident. Do not use your cell phone when you are driving. This could distract you and cause an accident. Pull over if you need to make a call or send a text message. Practice safe sex. Use latex condoms if are sexually active and have more than one partner. Your healthcare provider may recommend screening tests for sexually transmitted infections (STIs). Wear helmets, lifejackets, and protective gear. Always wear a helmet when you ride a bike or motorcycle, go skiing, or play sports that could cause a head injury. Wear protective equipment when you play sports. Wear a lifejacket when you are on a boat or doing water sports.     © Copyright [employer] 2022 Information is for End User's use only and may not be sold, redistributed or otherwise used for commercial purposes. The above information is an  only. It is not intended as medical advice for individual conditions or treatments. Talk to your doctor, nurse or pharmacist before following any medical regimen to see if it is safe and effective for you.

## 2023-08-23 NOTE — PROGRESS NOTES
ADULT ANNUAL PHYSICAL  [facility]    NAME: Karyle Oats  AGE: 28 y.o. SEX: male  : 1995     DATE: 2023     Assessment and Plan:     Problem List Items Addressed This Visit        Other    Annual physical exam - Primary     Normal exam.   Lipids normal at 2020. Screening labs reviewed. UTD on vaccines. No FH of colon cancer. Immunizations and preventive care screenings were discussed with patient today. Appropriate education was printed on patient's after visit summary. Counseling:  Alcohol/drug use: discussed moderation in alcohol intake, the recommendations for healthy alcohol use, and avoidance of illicit drug use. Dental Health: discussed importance of regular tooth brushing, flossing, and dental visits. Exercise: the importance of regular exercise/physical activity was discussed. Recommend exercise 3-5 times per week for at least 30 minutes. Return in 1 year (on 2024). Chief Complaint:     Chief Complaint   Patient presents with   • Annual Exam      History of Present Illness:     Adult Annual Physical   Patient here for a comprehensive physical exam. The patient reports no problems. Diet and Physical Activity  Diet/Nutrition: well balanced diet. Exercise: vigorous cardiovascular exercise. Depression Screening  PHQ-2/9 Depression Screening    Little interest or pleasure in doing things: 0 - not at all  Feeling down, depressed, or hopeless: 0 - not at all  PHQ-2 Score: 0  PHQ-2 Interpretation: Negative depression screen       General Health  Sleep: sleeps well. He doesn't feel like he is well rested  Hearing: no issues. Vision: goes for regular eye exams and wears glasses. Dental: regular dental visits.  Health  History of STDs?: no.     Review of Systems:     Review of Systems   Constitutional: Negative for activity change, appetite change, fever and unexpected weight change. HENT: Negative for ear pain, postnasal drip and rhinorrhea. Eyes: Negative for photophobia and pain. Respiratory: Negative for cough, shortness of breath and wheezing. Cardiovascular: Negative for chest pain, palpitations and leg swelling. Gastrointestinal: Negative for abdominal pain, blood in stool, nausea and vomiting. Endocrine: Negative for polydipsia and polyuria. Genitourinary: Negative for difficulty urinating, hematuria and urgency. Musculoskeletal: Negative for myalgias. Skin: Negative for rash. Neurological: Negative for dizziness. Psychiatric/Behavioral: Negative for confusion and sleep disturbance.       Past Medical History:     Past Medical History:   Diagnosis Date   • Broken bone     collar       Past Surgical History:     Past Surgical History:   Procedure Laterality Date   • APPENDECTOMY LAPAROSCOPIC N/A 6/3/2022    Procedure: APPENDECTOMY LAPAROSCOPIC;  Surgeon: Donald Schmidt DO;  Location: [facility];  Service: General      Social History:     Social History     Socioeconomic History   • Marital status: Single     Spouse name: None   • Number of children: None   • Years of education: None   • Highest education level: None   Occupational History   • None   Tobacco Use   • Smoking status: Never   • Smokeless tobacco: Never   Substance and Sexual Activity   • Alcohol use: Yes     Comment: Social   • Drug use: Never   • Sexual activity: None   Other Topics Concern   • None   Social History Narrative   • None     Social Determinants of Health     Financial Resource Strain: Low Risk  (2/4/2020)    Overall Financial Resource Strain (CARDIA)    • Difficulty of Paying Living Expenses: Not hard at all   Food Insecurity: No Food Insecurity (2/4/2020)    Hunger Vital Sign    • Worried About Running Out of Food in the Last Year: Never true    • Ran Out of Food in the Last Year: Never true   Transportation Needs: No Transportation Needs (2/4/2020)    PRAPARE - Transportation • Lack of Transportation (Medical): No    • Lack of Transportation (Non-Medical): No   Physical Activity: Sufficiently Active (2/4/2020)    Exercise Vital Sign    • Days of Exercise per Week: 3 days    • Minutes of Exercise per Session: 60 min   Stress: No Stress Concern Present (2/4/2020)    [facility]    • Feeling of Stress : Not at all   Social Connections: Unknown (2/4/2020)    Social Connection and Isolation Panel [NHANES]    • Frequency of Communication with Friends and Family: Patient refused    • Frequency of Social Gatherings with Friends and Family: Patient refused    • Attends [employer] Services: Patient refused    • Active Member of Clubs or Organizations: Patient refused    • Attends Club or Organization Meetings: Patient refused    • Marital Status: Patient refused   Intimate Partner Violence: Not At Risk (2/4/2020)    Humiliation, Afraid, Rape, and Kick questionnaire    • Fear of Current or Ex-Partner: No    • Emotionally Abused: No    • Physically Abused: No    • Sexually Abused: No   Housing Stability: Not on file      Family History:     Family History   Problem Relation Age of Onset   • Diabetes Paternal Grandmother    • Asthma Paternal Grandmother    • Asthma Paternal Aunt       Current Medications:     Current Outpatient Medications   Medication Sig Dispense Refill   • ibuprofen (MOTRIN) 800 mg tablet Take 1 tablet (800 mg total) by mouth every 8 (eight) hours as needed for moderate pain for up to 14 days 30 tablet 0   • methocarbamol (ROBAXIN) 750 mg tablet Take 1 tablet (750 mg total) by mouth every 6 (six) hours for 25 doses 25 tablet 0   • Multiple Vitamin (MULTIVITAMIN) tablet Take 1 tablet by mouth daily       No current facility-administered medications for this visit.       Allergies:     No Known Allergies   Physical Exam:     /70   Pulse 60   Temp 98.1 °F (36.7 °C)   Resp 15   Ht 5' 9" (1.753 m)   Wt 62.1 kg (137 lb)   SpO2 98%   BMI 20.23 kg/m²     Physical Exam  Constitutional:       General: He is not in acute distress. Appearance: He is well-developed. HENT:      Head: Normocephalic and atraumatic. Eyes:      General: No scleral icterus. Conjunctiva/sclera: Conjunctivae normal.      Pupils: Pupils are equal, round, and reactive to light. Cardiovascular:      Rate and Rhythm: Normal rate and regular rhythm. Heart sounds: Normal heart sounds. No murmur heard. No friction rub. No gallop. Pulmonary:      Effort: Pulmonary effort is normal. No respiratory distress. Breath sounds: Normal breath sounds. No wheezing or rales. Abdominal:      General: Bowel sounds are normal. There is no distension. Palpations: Abdomen is soft. There is no mass. Tenderness: There is no abdominal tenderness. There is no guarding. Musculoskeletal:         General: No tenderness. Cervical back: Normal range of motion. Skin:     General: Skin is warm and dry. Findings: No rash. Neurological:      Mental Status: He is alert and oriented to person, place, and time. Cranial Nerves: No cranial nerve deficit. Motor: No abnormal muscle tone.           Peyton Valle MD   [facility]

## 2024-07-30 ENCOUNTER — OFFICE VISIT (OUTPATIENT)
Dept: PHYSICAL THERAPY | Facility: OTHER | Age: 29
End: 2024-07-30
Payer: COMMERCIAL

## 2024-07-30 DIAGNOSIS — S93.411A SPRAIN OF CALCANEOFIBULAR LIGAMENT OF RIGHT ANKLE, INITIAL ENCOUNTER: ICD-10-CM

## 2024-07-30 DIAGNOSIS — S93.491A SPRAIN OF ANTERIOR TALOFIBULAR LIGAMENT OF RIGHT ANKLE, INITIAL ENCOUNTER: Primary | ICD-10-CM

## 2024-07-30 PROCEDURE — 97112 NEUROMUSCULAR REEDUCATION: CPT | Performed by: PHYSICAL THERAPIST

## 2024-07-30 PROCEDURE — 97162 PT EVAL MOD COMPLEX 30 MIN: CPT | Performed by: PHYSICAL THERAPIST

## 2024-07-30 PROCEDURE — 97110 THERAPEUTIC EXERCISES: CPT | Performed by: PHYSICAL THERAPIST

## 2024-07-30 NOTE — PROGRESS NOTES
PT Evaluation     Today's date: 2024  Patient name: Brandon Dukes  : 1995  MRN: [id number]  Referring provider: Maria Fernanda Pozo, GIANCARLO  Dx:   Encounter Diagnosis     ICD-10-CM    1. Sprain of anterior talofibular ligament of right ankle, initial encounter  S93.491A       2. Sprain of calcaneofibular ligament of right ankle, initial encounter  S93.411A                      Assessment    Assessment details: Brandon Dukes is a pleasant 28 y.o. male who presents for outpatient physical therapy evaluation following an acute ankle sprain on 24. He was able to weightbear at time of injury and today in the office. All fracture tests are negative ([place] ankle rule, Bernese ankle rules, compression, and percussion). Upon evaluation he has Positive inversion talar tilt and anterior drawer. His pain is localized to the sinus tarsi, CFL and fibularis. He has mild weakness with fibularis tertius. He demonstrates signs and symptoms consistent with lateral ankle sprain. He was educated on use of compression and completion of ROM program with elevation to help reduce swelling. He was also educated on intrinsic activation. He currently demonstrates poor neuromuscular control, balance and decreased ROM. His goals are to return to an active job and active lifestlye with running.       Goals  STG's to be achieved in 4 weeks:  1) Patient will demonstrate normal pain free foot and ankle ROM  2) Patient will improve LE strength by 1/2 muscle grade.  3) Patient will report no greater than 2/10 ankle pain with prolonged standing 30 minutes or greater.   4) Patient will return to running 15 + miles in a week with no greater than 2/10 ankle pain.   5) Patient will score 75 or greater in FOTO.         Subjective Evaluation    History of Present Illness  Date of onset: 2024  Mechanism of injury: Patient reports rolling his ankle about a week ago. He has not been able to run since. He has been elevating, icing and  "taking ibuprofen. Patient works a  and is currently fairly sedentary but hopes to be back out and shooting. Patient reports he has been running to stay physically active but was not specifically training for anything. His mileage is anywhere from 15-40 miles depending on schedule.     Patient reports that he had some significant bruising and swelling. He has difficulty with descending stairs. In general he feels better with movement.     He has not been seen by any other provider. No prior imaging.         Objective     Strength/Myotome Testing     Additional Strength Details  R Ankle: anterior tib-(5/5), posterior tib-(4+/5), fib longus/brevis-(4+/5), fib tertius-(3+/5)  L Ankle: DF- anterior tib-(5/5), posterior tib-(5/5), fib longus/brevis-(5/5), fib tertius-(5/5)  Foot:   R EHL: (4+/5), EDL (4+/5), FHL (4+/5), FDL (4+/5)  L EHL: (5/5) EDL (5/5), FHL (5/5), FDL (5/5)      Tests     Right Ankle/Foot   Positive for anterior drawer and inversion talar tilt.   Negative for calcaneal squeeze, eversion talar tilt, percussion, posterior drawer, syndesmosis squeeze and syndesmosis external rotation.     Additional Tests Details  (-) bernese ankle rules  (-) [place] ankle rules             Precautions: recent lateral ankle sprain      Manuals 7/30            Light effleurage             Inv mulligan distal fib mob             Distal fib stabilization tape ES            Navicular sling tape ES            Neuro Re-Ed             Short foot 10 x 10\"            Toe yoga 10 x 5\"            FHL, FDL TB 10 x peach                                                                Ther Ex             bike             Ankle pump 30 Add TB           Ankle ABCs 1x            Ankle inv/ev 30 Add TB           Plantar fascia towel stretch  4 x 30\"           Seated HR/TR             Wobble board                                       Ther Activity                                       Gait Training                                "        Modalities

## 2024-08-06 ENCOUNTER — OFFICE VISIT (OUTPATIENT)
Dept: PHYSICAL THERAPY | Facility: OTHER | Age: 29
End: 2024-08-06
Payer: COMMERCIAL

## 2024-08-06 DIAGNOSIS — S93.411A SPRAIN OF CALCANEOFIBULAR LIGAMENT OF RIGHT ANKLE, INITIAL ENCOUNTER: ICD-10-CM

## 2024-08-06 DIAGNOSIS — S93.491A SPRAIN OF ANTERIOR TALOFIBULAR LIGAMENT OF RIGHT ANKLE, INITIAL ENCOUNTER: Primary | ICD-10-CM

## 2024-08-06 PROCEDURE — 97140 MANUAL THERAPY 1/> REGIONS: CPT | Performed by: PHYSICAL THERAPIST

## 2024-08-06 PROCEDURE — 97112 NEUROMUSCULAR REEDUCATION: CPT | Performed by: PHYSICAL THERAPIST

## 2024-08-06 PROCEDURE — 97110 THERAPEUTIC EXERCISES: CPT | Performed by: PHYSICAL THERAPIST

## 2024-08-06 NOTE — PROGRESS NOTES
"Daily Note     Today's date: 2024  Patient name: Brandon Dukes  : 1995  MRN: [id number]  Referring provider: Maria Fernanda Pozo, PT  Dx: No diagnosis found.             1 on 1 from [phone], not billed remainder    Subjective: Patient reports tightness in his achilles this visit and occasional popping on the outside of his ankle with pointing his toes.       Objective: See treatment diary below      Assessment: Tolerated treatment well. Patient demonstrated fatigue post treatment, exhibited good technique with therapeutic exercises, and would benefit from continued PT. Significant improvement noted in swelling and bruising this visit. However, there is soreness localized to the fifth met and distal 1/3 of the fibula. Although gait is normalized we did discuss follow up for potential imaging due to specific bony tenderness this visit. Patient instructed to continue to avoid impact activity.       Plan: Continue per plan of care.      Precautions: recent lateral ankle sprain      Manuals  8           Light effleurage  ES           Inv mulligan distal fib mob             Distal fib stabilization tape ES            Navicular sling tape ES            Neuro Re-Ed             Short foot 10 x 10\"            Toe yoga 10 x 5\"            FHL, FDL TB 10 x peach 3 x 10 ea peach           Ankle 4 way, focus on ecc control  3 x 10 ea peach           SLS                                       Ther Ex             bike             Ankle pump 30 Add TB           Ankle ABCs 1x            Ankle inv/ev 30 Add TB           Plantar fascia towel stretch  4 x 30\"           Seated HR/TR             Wobble board                                       Ther Activity                                       Gait Training                                       Modalities                                            "

## 2024-08-08 ENCOUNTER — OFFICE VISIT (OUTPATIENT)
Dept: PHYSICAL THERAPY | Facility: OTHER | Age: 29
End: 2024-08-08
Payer: COMMERCIAL

## 2024-08-08 DIAGNOSIS — S93.491A SPRAIN OF ANTERIOR TALOFIBULAR LIGAMENT OF RIGHT ANKLE, INITIAL ENCOUNTER: Primary | ICD-10-CM

## 2024-08-08 PROCEDURE — 97112 NEUROMUSCULAR REEDUCATION: CPT | Performed by: PHYSICAL THERAPIST

## 2024-08-08 PROCEDURE — 97110 THERAPEUTIC EXERCISES: CPT | Performed by: PHYSICAL THERAPIST

## 2024-08-08 PROCEDURE — 97140 MANUAL THERAPY 1/> REGIONS: CPT | Performed by: PHYSICAL THERAPIST

## 2024-08-08 NOTE — PROGRESS NOTES
"Daily Note     Today's date: 2024  Patient name: Brandon Dukes  : 1995  MRN: [id number]  Referring provider: Maria Fernanda Pozo, GIANCARLO  Dx:   Encounter Diagnosis     ICD-10-CM    1. Sprain of anterior talofibular ligament of right ankle, initial encounter  S93.491A           Start Time: 1645  Stop Time: 1745  Total time in clinic (min): 60 minutes  1 on 1 from 445-530, not billed remainder    Subjective: Patient reports overall he is doing ok. He continues to have the most difficulty with driving.       Objective: See treatment diary below      Assessment: Tolerated treatment well. Patient demonstrated fatigue post treatment, exhibited good technique with therapeutic exercises, and would benefit from continued PT.       Plan: Continue per plan of care.      Precautions: recent lateral ankle sprain      Manuals           Light effleurage  ES ES          Inv mulligan distal fib mob             Distal fib stabilization tape ES            Navicular sling tape ES            Neuro Re-Ed             Short foot 10 x 10\"            Toe yoga 10 x 5\"            FHL, FDL TB 10 x peach 3 x 10 ea peach 3 x 10 OTB          Ankle 4 way, focus on ecc control  3 x 10 ea peach 3 x 10 OTB          SLS   30\" x 2 green foam                                    Ther Ex             bike   5'          Slant board   4 x 30\"          Ankle pump 30 Add TB           Ankle ABCs 1x            Ankle inv/ev 30 Add TB           Plantar fascia towel stretch  4 x 30\"           Seated HR/TR   Stand 3x 10 ea          Wobble board   Seated 10 x ea                                    Ther Activity                                       Gait Training                                       Modalities                                            "

## 2024-08-13 ENCOUNTER — OFFICE VISIT (OUTPATIENT)
Dept: PHYSICAL THERAPY | Facility: OTHER | Age: 29
End: 2024-08-13
Payer: COMMERCIAL

## 2024-08-13 DIAGNOSIS — S93.411A SPRAIN OF CALCANEOFIBULAR LIGAMENT OF RIGHT ANKLE, INITIAL ENCOUNTER: ICD-10-CM

## 2024-08-13 DIAGNOSIS — S93.491A SPRAIN OF ANTERIOR TALOFIBULAR LIGAMENT OF RIGHT ANKLE, INITIAL ENCOUNTER: Primary | ICD-10-CM

## 2024-08-13 PROCEDURE — 97112 NEUROMUSCULAR REEDUCATION: CPT | Performed by: PHYSICAL THERAPIST

## 2024-08-13 PROCEDURE — 97110 THERAPEUTIC EXERCISES: CPT | Performed by: PHYSICAL THERAPIST

## 2024-08-13 NOTE — PROGRESS NOTES
"Daily Note     Today's date: 2024  Patient name: Brandon Dukes  : 1995  MRN: [id number]  Referring provider: Maria Fernanda Pozo, GIANCARLO  Dx:   No diagnosis found.               1 on 1 from 445-530, not billed remainder    Subjective: Patient reports he did follow up with [facility] and imaging was negative for fracture.       Objective: See treatment diary below      Assessment: Tolerated treatment well. Patient demonstrated fatigue post treatment, exhibited good technique with therapeutic exercises, and would benefit from continued PT.  Increased proprioceptive program with moderate fatigue and challenge, however able to tolerate and demonstrate good form when cued.       Plan: Continue per plan of care.      Precautions: recent lateral ankle sprain      Manuals          Light effleurage  ES ES          Inv mulligan distal fib mob             Distal fib stabilization tape ES            Navicular sling tape ES            Neuro Re-Ed             Short foot 10 x 10\"            Toe yoga 10 x 5\"            FHL, FDL TB 10 x peach 3 x 10 ea peach 3 x 10 OTB 3 x 10 OTB         Ankle 4 way, focus on ecc control  3 x 10 ea peach 3 x 10 OTB 3 x 10 OTB         SLS   30\" x 2 green foam Rebounder 3 x 10 green foam         Sidestep TB through arch    [place] 2 laps 1/2 mirror         Cone     Tall cones to floor 2 laps                      Ther Ex             bike   5' 5'         Slant board   4 x 30\" 4 x 30\"         Ankle pump 30 Add TB           Ankle ABCs 1x            Ankle inv/ev 30 Add TB           Plantar fascia towel stretch  4 x 30\"           Seated HR/TR   Stand 3x 10 ea 3 x 10         Wobble board, AP, ML, CW, CCW   Seated 10 x ea 10 ea                                    Ther Activity                                       Gait Training                                       Modalities             gameready    15'                           "

## 2024-08-15 ENCOUNTER — OFFICE VISIT (OUTPATIENT)
Dept: PHYSICAL THERAPY | Facility: OTHER | Age: 29
End: 2024-08-15
Payer: COMMERCIAL

## 2024-08-15 DIAGNOSIS — S93.411A SPRAIN OF CALCANEOFIBULAR LIGAMENT OF RIGHT ANKLE, INITIAL ENCOUNTER: ICD-10-CM

## 2024-08-15 DIAGNOSIS — S93.491A SPRAIN OF ANTERIOR TALOFIBULAR LIGAMENT OF RIGHT ANKLE, INITIAL ENCOUNTER: Primary | ICD-10-CM

## 2024-08-15 PROCEDURE — 97112 NEUROMUSCULAR REEDUCATION: CPT | Performed by: PHYSICAL THERAPIST

## 2024-08-15 PROCEDURE — 97140 MANUAL THERAPY 1/> REGIONS: CPT | Performed by: PHYSICAL THERAPIST

## 2024-08-15 PROCEDURE — 97110 THERAPEUTIC EXERCISES: CPT | Performed by: PHYSICAL THERAPIST

## 2024-08-15 NOTE — PROGRESS NOTES
"Daily Note     Today's date: 2024  Patient name: Brandon Dukes  : 1995  MRN: [id number]  Referring provider: Maria Fernanda Pozo PT  Dx:   Encounter Diagnosis     ICD-10-CM    1. Sprain of anterior talofibular ligament of right ankle, initial encounter  S93.491A       2. Sprain of calcaneofibular ligament of right ankle, initial encounter  S93.411A             Start Time: 1645  Stop Time: 174  Total time in clinic (min): 60 minutes  1 on 1 from 445-530, not billed remainder    Subjective: Patient reports he feels about the same. Denies any improvement in swelling.       Objective: See treatment diary below      Assessment: Tolerated treatment well. Patient demonstrated fatigue post treatment, exhibited good technique with therapeutic exercises, and would benefit from continued PT.  Continued progression of proprioceptive program as charted below. Patient appropriately fatigued but denies any increase in pain with progression. Educated patient on use of compression sock to help address swelling      Plan: Continue per plan of care.      Precautions: recent lateral ankle sprain      Manuals 7/30 8/6 8/8 8/13 8/15        Light effleurage  ES ES          Inv mulligan distal fib mob             Distal fib stabilization tape ES            Navicular sling tape ES            Neuro Re-Ed             Short foot 10 x 10\"            Toe yoga 10 x 5\"            FHL, FDL TB 10 x peach 3 x 10 ea peach 3 x 10 OTB 3 x 10 OTB 3 x 10 OTB        Ankle 4 way, focus on ecc control  3 x 10 ea peach 3 x 10 OTB 3 x 10 OTB 3 x 10 OTB        SLS   30\" x 2 green foam Rebounder 3 x 10 green foam         Sidestep TB through arch    [place] 2 laps 1/2 mirror Peach at table 4 laps, HR TR, short foot        Cone     Tall cones to floor 2 laps Short cones airex 3 laps        stork     10 x ea blue TB                     Ther Ex             bike   5' 5' 5'        Slant board   4 x 30\" 4 x 30\" 2 x 30\" knee ext/knee flex      " "  Ankle pump 30 Add TB           Ankle ABCs 1x            Ankle inv/ev 30 Add TB           Plantar fascia towel stretch  4 x 30\"           Seated HR/TR   Stand 3x 10 ea 3 x 10 3 x 10        Wobble board, AP, ML, CW, CCW   Seated 10 x ea 10 ea  10 ea                                  Ther Activity                                       Gait Training                                       Modalities             gameready    15' 15'                          "

## 2024-08-20 ENCOUNTER — OFFICE VISIT (OUTPATIENT)
Dept: PHYSICAL THERAPY | Facility: OTHER | Age: 29
End: 2024-08-20
Payer: COMMERCIAL

## 2024-08-20 DIAGNOSIS — S93.491A SPRAIN OF ANTERIOR TALOFIBULAR LIGAMENT OF RIGHT ANKLE, INITIAL ENCOUNTER: Primary | ICD-10-CM

## 2024-08-20 PROCEDURE — 97110 THERAPEUTIC EXERCISES: CPT | Performed by: PHYSICAL THERAPIST

## 2024-08-20 PROCEDURE — 97140 MANUAL THERAPY 1/> REGIONS: CPT | Performed by: PHYSICAL THERAPIST

## 2024-08-20 PROCEDURE — 97112 NEUROMUSCULAR REEDUCATION: CPT | Performed by: PHYSICAL THERAPIST

## 2024-08-20 NOTE — PROGRESS NOTES
"Daily Note     Today's date: 2024  Patient name: Brandon Dukes  : 1995  MRN: [id number]  Referring provider: Maria Fernanda Pozo, GIANCARLO  Dx:   Encounter Diagnosis     ICD-10-CM    1. Sprain of anterior talofibular ligament of right ankle, initial encounter  S93.491A                      1 on 1 from 445-530, not billed remainder    Subjective: Patient reports he felt pretty good over the weekend but does note an increase of pain after being stuck in traffic and having to transition back and forth from gas to brake.       Objective: See treatment diary below      Assessment: Tolerated treatment well. Patient demonstrated fatigue post treatment, exhibited good technique with therapeutic exercises, and would benefit from continued PT.  Progress calf raise to 2-1 eccentric with fatigue but good tolerance. Held TB ankle 4 way due to soreness. Progressed uneven surface for SL balance activity      Plan: Continue per plan of care.      Precautions: recent lateral ankle sprain      Manuals 7/30 8/6 8/8 8/13 8/15 8/20       Light effleurage  ES ES          Inv mulligan distal fib mob             Distal fib stabilization tape ES            Navicular sling tape ES            Neuro Re-Ed             Eccentric calf raise      2-1 3 x 10        Short foot 10 x 10\"            Toe yoga 10 x 5\"            FHL, FDL TB 10 x peach 3 x 10 ea peach 3 x 10 OTB 3 x 10 OTB 3 x 10 OTB        Ankle 4 way, focus on ecc control  3 x 10 ea peach 3 x 10 OTB 3 x 10 OTB 3 x 10 OTB        SLS   30\" x 2 green foam Rebounder 3 x 10 green foam  Rebounder airex 3 x 10        Sidestep TB through arch    [place] 2 laps 1/2 mirror [place] at table 4 laps, HR TR, short foot [place] at table 4 laps, HR TR, short foot       Cone     Tall cones to floor 2 laps Short cones airex 3 laps Short cones airex 3 laps       stork     10 x ea blue TB Airex 10 ea OTB                    Ther Ex             bike   5' 5' 5' 5'       Slant board   4 x 30\" 4 x 30\" 2 " "x 30\" knee ext/knee flex 2 x 30\" ea       Ankle pump 30 Add TB           Ankle ABCs 1x            Ankle inv/ev 30 Add TB           Plantar fascia towel stretch  4 x 30\"           Seated HR/TR   Stand 3x 10 ea 3 x 10 3 x 10        Wobble board, AP, ML, CW, CCW   Seated 10 x ea 10 ea  10 ea 30 ea                                 Ther Activity                                       Gait Training                                       Modalities             gameready    15' 15'                          "

## 2024-08-22 ENCOUNTER — OFFICE VISIT (OUTPATIENT)
Dept: PHYSICAL THERAPY | Facility: OTHER | Age: 29
End: 2024-08-22
Payer: COMMERCIAL

## 2024-08-22 DIAGNOSIS — S93.411A SPRAIN OF CALCANEOFIBULAR LIGAMENT OF RIGHT ANKLE, INITIAL ENCOUNTER: ICD-10-CM

## 2024-08-22 DIAGNOSIS — S93.491A SPRAIN OF ANTERIOR TALOFIBULAR LIGAMENT OF RIGHT ANKLE, INITIAL ENCOUNTER: Primary | ICD-10-CM

## 2024-08-22 PROCEDURE — 97140 MANUAL THERAPY 1/> REGIONS: CPT | Performed by: PHYSICAL THERAPIST

## 2024-08-22 PROCEDURE — 97110 THERAPEUTIC EXERCISES: CPT | Performed by: PHYSICAL THERAPIST

## 2024-08-22 PROCEDURE — 97112 NEUROMUSCULAR REEDUCATION: CPT | Performed by: PHYSICAL THERAPIST

## 2024-08-22 NOTE — PROGRESS NOTES
"Daily Note     Today's date: 2024  Patient name: Brandon Dukes  : 1995  MRN: [id number]  Referring provider: Maria Fernanda Pozo PT  Dx:   Encounter Diagnosis     ICD-10-CM    1. Sprain of anterior talofibular ligament of right ankle, initial encounter  S93.491A       2. Sprain of calcaneofibular ligament of right ankle, initial encounter  S93.411A             Start Time: 1650  Stop Time: 1755  Total time in clinic (min): 65 minutes  1 on 1 from 500-545, not billed remainder    Subjective: Patient reports continued gradual progress in foot pain.       Objective: See treatment diary below      Assessment: Tolerated treatment well. Patient demonstrated fatigue post treatment, exhibited good technique with therapeutic exercises, and would benefit from continued PT.  Continued progression of uneven surface as charted below.       Plan: Continue per plan of care.      Precautions: recent lateral ankle sprain      Manuals 7/30 8/6 8/8 8/13 8/15 8/20 8/22      Light effleurage  ES ES          Inv mulligan distal fib mob             Distal fib stabilization tape ES            Navicular sling tape ES            Gr 4 TCJ post       ES      Gr 4 med/lat mob       ES      Gr 4 intermet glide       ES                   Neuro Re-Ed             Eccentric calf raise      2-1 3 x 10  Post tib on airex 3 x 10      Short foot 10 x 10\"            Toe yoga 10 x 5\"            FHL, FDL TB 10 x peach 3 x 10 ea peach 3 x 10 OTB 3 x 10 OTB 3 x 10 OTB        Ankle 4 way, focus on ecc control  3 x 10 ea peach 3 x 10 OTB 3 x 10 OTB 3 x 10 OTB        SLS   30\" x 2 green foam Rebounder 3 x 10 green foam  Rebounder airex 3 x 10        Sidestep TB through arch    [place] 2 laps 1/2 mirror [place] at table 4 laps, HR TR, short foot [place] at table 4 laps, HR TR, short foot OTB 5 laps all 3 way      [facility]     Tall cones to floor 2 laps Short cones airex 3 laps Short cones airex 3 laps 4  airex      stork     10 x ea blue TB Airex 10 ea " "OTB Airex 10 OTB                   Ther Ex             bike   5' 5' 5' 5' 5'      Slant board   4 x 30\" 4 x 30\" 2 x 30\" knee ext/knee flex 2 x 30\" ea       Ankle pump 30 Add TB           Ankle ABCs 1x            Ankle inv/ev 30 Add TB           Plantar fascia towel stretch  4 x 30\"           Seated HR/TR   Stand 3x 10 ea 3 x 10 3 x 10        Wobble board, AP, ML, CW, CCW   Seated 10 x ea 10 ea  10 ea 30 ea 30 ea      Self DF mob       Plum 10 x 10\"                   Ther Activity                                       Gait Training                                       Modalities             gameready    15' 15'  15'                        "

## 2024-08-26 ENCOUNTER — APPOINTMENT (OUTPATIENT)
Dept: PHYSICAL THERAPY | Facility: OTHER | Age: 29
End: 2024-08-26
Payer: COMMERCIAL

## 2024-08-27 ENCOUNTER — OFFICE VISIT (OUTPATIENT)
Dept: PHYSICAL THERAPY | Facility: OTHER | Age: 29
End: 2024-08-27
Payer: COMMERCIAL

## 2024-08-27 DIAGNOSIS — S93.411A SPRAIN OF CALCANEOFIBULAR LIGAMENT OF RIGHT ANKLE, INITIAL ENCOUNTER: ICD-10-CM

## 2024-08-27 DIAGNOSIS — S93.491A SPRAIN OF ANTERIOR TALOFIBULAR LIGAMENT OF RIGHT ANKLE, INITIAL ENCOUNTER: Primary | ICD-10-CM

## 2024-08-27 PROCEDURE — 97112 NEUROMUSCULAR REEDUCATION: CPT | Performed by: PHYSICAL THERAPIST

## 2024-08-27 PROCEDURE — 97140 MANUAL THERAPY 1/> REGIONS: CPT | Performed by: PHYSICAL THERAPIST

## 2024-08-27 PROCEDURE — 97110 THERAPEUTIC EXERCISES: CPT | Performed by: PHYSICAL THERAPIST

## 2024-08-27 NOTE — PROGRESS NOTES
"Daily Note     Today's date: 2024  Patient name: Brandon Dukes  : 1995  MRN: [id number]  Referring provider: Maria Fernanda Pozo PT  Dx:   Encounter Diagnosis     ICD-10-CM    1. Sprain of anterior talofibular ligament of right ankle, initial encounter  S93.491A       2. Sprain of calcaneofibular ligament of right ankle, initial encounter  S93.411A           Start Time: 1700  Stop Time: 1800  Total time in clinic (min): 60 minutes  1 on 1from 500-545, not billed remainder    Subjective: Patient reports continued gradual improvement in ankle pain and stability. States that he currently does not have a PCP to send a plan of care for his ankle. Discussed that 30 days direct access will end on the  and provided with primary care sports medicine contact information to schedule an appointment.       Objective: See treatment diary below      Assessment: Tolerated treatment well. Patient demonstrated fatigue post treatment, exhibited good technique with therapeutic exercises, and would benefit from continued PT. Patient challenged with progression of balance and impact activity. Unable to complete SL BOSU balance due to poor proprioception. Discussed reassessment next visit of tolerance and potential initiation of anti gravity treadmill running.       Plan: Continue per plan of care.      Precautions: recent lateral ankle sprain      Manuals 7/30 8/6 8/8 8/13 8/15 8/20 8/22 8/27     Light effleurage  ES ES          Inv mulligan distal fib mob             Distal fib stabilization tape ES            Navicular sling tape ES            Gr 4 TCJ post       ES      Gr 4 med/lat mob       ES      Gr 4 intermet glide       ES                   Neuro Re-Ed             Eccentric calf raise      2-1 3 x 10  Post tib on airex 3 x 10 Post tib calf raise eccentric 3 x 10 green med ball, airex      Short foot 10 x 10\"            Toe yoga 10 x 5\"            FHL, FDL TB 10 x peach 3 x 10 ea peach 3 x 10 OTB 3 x 10 OTB 3 x " "10 OTB        Ankle 4 way, focus on ecc control  3 x 10 ea [place] 3 x 10 OTB 3 x 10 OTB 3 x 10 OTB        SLS   30\" x 2 green foam Rebounder 3 x 10 green foam  Rebounder airex 3 x 10        Sidestep TB through arch    [place] 2 laps 1/2 mirror [place]ach at table 4 laps, HR TR, short foot [place] at table 4 laps, HR TR, short foot OTB 5 laps all 3 way Sidestep no band on balance beam, 2 laps 3 way     [facility]     Tall cones to floor 2 laps Short cones airex 3 laps Short cones airex 3 laps 4  airex 4 airex     stork     10 x ea blue TB Airex 10 ea OTB Airex 10 OTB      SL focus blazepod        30\" x 3 8\" and airex unable to do BOSU     Sidestep over bosu        10 x 5\"     Ther Ex             bike   5' 5' 5' 5' 5' 5'     Slant board   4 x 30\" 4 x 30\" 2 x 30\" knee ext/knee flex 2 x 30\" ea  2 x 30\"     Ankle pump 30 Add TB           Ankle ABCs 1x            Ankle inv/ev 30 Add TB           Plantar fascia towel stretch  4 x 30\"           Seated HR/TR   Stand 3x 10 ea 3 x 10 3 x 10        Wobble board, AP, ML, CW, CCW   Seated 10 x ea 10 ea  10 ea 30 ea 30 ea 10 ea     Self DF mob       Plum 10 x 10\"                   Ther Activity                                       Gait Training                                       Modalities             gameready    15' 15'  15' 15'                         "

## 2024-08-27 NOTE — PROGRESS NOTES
PT Evaluation     Today's date: 2024  Patient name: Brandon Dukes  : 1995  MRN: [id number]  Referring provider: Maria Fernanda Pozo, GIANCARLO  Dx:   No diagnosis found.                 Assessment    Assessment details: Brandon Dukes is a pleasant 28 y.o. male who presents for outpatient physical therapy evaluation following an acute ankle sprain on 24. He was able to weightbear at time of injury and today in the office. All fracture tests are negative ([place] ankle rule, Bernese ankle rules, compression, and percussion). Upon evaluation he has Positive inversion talar tilt and anterior drawer. His pain is localized to the sinus tarsi, CFL and fibularis. He has mild weakness with fibularis tertius. He demonstrates signs and symptoms consistent with lateral ankle sprain. He was educated on use of compression and completion of ROM program with elevation to help reduce swelling. He was also educated on intrinsic activation. He currently demonstrates poor neuromuscular control, balance and decreased ROM. His goals are to return to an active job and active lifestlye with running.       Goals  STG's to be achieved in 4 weeks:  1) Patient will demonstrate normal pain free foot and ankle ROM  2) Patient will improve LE strength by 1/2 muscle grade.  3) Patient will report no greater than 2/10 ankle pain with prolonged standing 30 minutes or greater.   4) Patient will return to running 15 + miles in a week with no greater than 2/10 ankle pain.   5) Patient will score 75 or greater in FOTO.       Subjective Evaluation    History of Present Illness  Date of onset: 2024  Mechanism of injury: Patient reports rolling his ankle about a week ago. He has not been able to run since. He has been elevating, icing and taking ibuprofen. Patient works a  and is currently fairly sedentary but hopes to be back out and shooting. Patient reports he has been running to stay physically active but was not  "specifically training for anything. His mileage is anywhere from 15-40 miles depending on schedule.     Patient reports that he had some significant bruising and swelling. He has difficulty with descending stairs. In general he feels better with movement.     He has not been seen by any other provider. No prior imaging.       Objective     Strength/Myotome Testing     Additional Strength Details  R Ankle: anterior tib-(5/5), posterior tib-(4+/5), fib longus/brevis-(4+/5), fib tertius-(3+/5)  L Ankle: DF- anterior tib-(5/5), posterior tib-(5/5), fib longus/brevis-(5/5), fib tertius-(5/5)  Foot:   R EHL: (4+/5), EDL (4+/5), FHL (4+/5), FDL (4+/5)  L EHL: (5/5) EDL (5/5), FHL (5/5), FDL (5/5)      Tests     Right Ankle/Foot   Positive for anterior drawer and inversion talar tilt.   Negative for calcaneal squeeze, eversion talar tilt, percussion, posterior drawer, syndesmosis squeeze and syndesmosis external rotation.     Additional Tests Details  (-) bernese ankle rules  (-) [place] ankle rules             Precautions: recent lateral ankle sprain      Manuals 7/30            Light effleurage             Inv mulligan distal fib mob             Distal fib stabilization tape ES            Navicular sling tape ES            Neuro Re-Ed             Short foot 10 x 10\"            Toe yoga 10 x 5\"            FHL, FDL TB 10 x peach                                                                Ther Ex             bike             Ankle pump 30 Add TB           Ankle ABCs 1x            Ankle inv/ev 30 Add TB           Plantar fascia towel stretch  4 x 30\"           Seated HR/TR             Wobble board                                       Ther Activity                                       Gait Training                                       Modalities                                            "

## 2024-08-29 ENCOUNTER — OFFICE VISIT (OUTPATIENT)
Dept: PHYSICAL THERAPY | Facility: OTHER | Age: 29
End: 2024-08-29
Payer: COMMERCIAL

## 2024-08-29 DIAGNOSIS — S93.411A SPRAIN OF CALCANEOFIBULAR LIGAMENT OF RIGHT ANKLE, INITIAL ENCOUNTER: ICD-10-CM

## 2024-08-29 DIAGNOSIS — S93.491A SPRAIN OF ANTERIOR TALOFIBULAR LIGAMENT OF RIGHT ANKLE, INITIAL ENCOUNTER: Primary | ICD-10-CM

## 2024-08-29 PROCEDURE — 97530 THERAPEUTIC ACTIVITIES: CPT | Performed by: PHYSICAL THERAPIST

## 2024-08-29 PROCEDURE — 97112 NEUROMUSCULAR REEDUCATION: CPT | Performed by: PHYSICAL THERAPIST

## 2024-08-29 PROCEDURE — 97140 MANUAL THERAPY 1/> REGIONS: CPT | Performed by: PHYSICAL THERAPIST

## 2024-08-30 NOTE — PROGRESS NOTES
"Daily Note     Today's date: 2024  Patient name: Brandon Dukes  : 1995  MRN: [id number]  Referring provider: Maria Fernanda Pozo, GIANCARLO  Dx:   No diagnosis found.      Start Time: 164  Stop Time:   Total time in clinic (min): 105 minutes  1 on 1from 445-515, not billed remainder    Subjective: Patient reports continued gradual improvement in ankle pain and stability. States that he currently does not have a PCP to send a plan of care for his ankle. Discussed that 30 days direct access will end on the . Patient will reach out to schedule additional PT after he has been seen by a physician as direct access period has ended.       Objective: See treatment diary below      Assessment: Tolerated treatment well. Patient demonstrated fatigue post treatment, exhibited good technique with therapeutic exercises, and would benefit from continued PT. Patient challenged with progression of balance and impact activity. Unable to complete SL BOSU balance due to poor proprioception. Added anti gravity treadmill running this session. Patient does note some popping initially but states he felt better with additional intervals.       Plan: Continue per plan of care.      Precautions: recent lateral ankle sprain      Manuals 7/30 8/6 8/8 8/13 8/15 8/20 8/22 8/27 8/29    Light effleurage  ES ES          Inv mulligan distal fib mob             Distal fib stabilization tape ES            Navicular sling tape ES            Gr 4 TCJ post       ES  ES    Gr 4 med/lat mob       ES  ES    Gr 4 intermet glide       ES  ES                 Neuro Re-Ed             Eccentric calf raise      2-1 3 x 10  Post tib on airex 3 x 10 Post tib calf raise eccentric 3 x 10 green med ball, airex  2-1 eccetric 3 x 10    Short foot 10 x 10\"            Toe yoga 10 x 5\"            FHL, FDL TB 10 x peach 3 x 10 ea peach 3 x 10 OTB 3 x 10 OTB 3 x 10 OTB        Ankle 4 way, focus on ecc control  3 x 10 ea [place] 3 x 10 OTB 3 x 10 OTB 3 x 10 OTB     " "   SLS   30\" x 2 green foam Rebounder 3 x 10 green foam  Rebounder airex 3 x 10        Lateral step down         6\" 3 x 10    Sidestep TB through arch    [place] 2 laps 1/2 mirror [place] at table 4 laps, HR TR, short foot [place] at table 4 laps, HR TR, short foot OTB 5 laps all 3 way Sidestep no band on balance beam, 2 laps 3 way Sidestep no band on balance beam, 2 laps 3 way    [facility]     Tall cones to floor 2 laps Short cones airex 3 laps Short cones airex 3 laps 4  airex 4 airex 4 airex    stork     10 x ea blue TB Airex 10 ea OTB Airex 10 OTB      SL focus blazepod        30\" x 3 8\" and airex unable to do BOSU 30\" 3 x 1' break airex 6\" step    Sidestep over bosu        10 x 5\" 10 x 5\"    Ther Ex             bike   5' 5' 5' 5' 5' 5' 5'    Slant board   4 x 30\" 4 x 30\" 2 x 30\" knee ext/knee flex 2 x 30\" ea  2 x 30\" 2 x 30\" ea    Ankle pump 30 Add TB           Ankle ABCs 1x            Ankle inv/ev 30 Add TB           Plantar fascia towel stretch  4 x 30\"           Seated HR/TR   Stand 3x 10 ea 3 x 10 3 x 10        Wobble board, AP, ML, CW, CCW   Seated 10 x ea 10 ea  10 ea 30 ea 30 ea 10 ea     Self DF mob       Plum 10 x 10\"  Plum 10x 10\"                 Ther Activity             Hexagon line jump         5 x ea angle 1 x CW, CCW    BOOST running         2' jog, 1' walk    Gait Training                                       Modalities             gameready    15' 15'  15' 15' 15'                        "

## 2024-09-29 ENCOUNTER — HOSPITAL ENCOUNTER (EMERGENCY)
Facility: HOSPITAL | Age: 29
Discharge: HOME | End: 2024-09-29
Attending: EMERGENCY MEDICINE | Admitting: EMERGENCY MEDICINE
Payer: COMMERCIAL

## 2024-09-29 VITALS
RESPIRATION RATE: 16 BRPM | DIASTOLIC BLOOD PRESSURE: 90 MMHG | HEART RATE: 125 BPM | OXYGEN SATURATION: 96 % | TEMPERATURE: 98.3 F | SYSTOLIC BLOOD PRESSURE: 163 MMHG

## 2024-09-29 DIAGNOSIS — F10.929 ALCOHOL INTOXICATION (HCC): Primary | ICD-10-CM

## 2024-09-29 PROCEDURE — 99283 EMERGENCY DEPT VISIT LOW MDM: CPT | Performed by: EMERGENCY MEDICINE

## 2024-09-29 PROCEDURE — 99283 EMERGENCY DEPT VISIT LOW MDM: CPT

## 2024-09-29 NOTE — ED ATTENDING ATTESTATION
9/29/2024  IDeo MD, saw and evaluated the patient. I have discussed the patient with the resident/non-physician practitioner and agree with the resident's/non-physician practitioner's findings, Plan of Care, and MDM as documented in the resident's/non-physician practitioner's note, except where noted. All available labs and Radiology studies were reviewed.  I was present for key portions of any procedure(s) performed by the resident/non-physician practitioner and I was immediately available to provide assistance.       At this point I agree with the current assessment done in the Emergency Department.  I have conducted an independent evaluation of this patient a history and physical is as follows:      Final Diagnosis:  1. Alcohol intoxication (HCC)      Chief Complaint   Patient presents with    Alcohol Intoxication     Pt was found laying in the street by bystanders who thought he was dead and called 911. Police determined pt to be ETOH intoxicated and was brought in by EMS. Pt has no complaints or injuries at this time           A:  -28-year-old male presents with alcohol intoxication.      P:  -Patient has no complaints.  Currently eating.  Able to ambulate without any issues.  Safe for discharge home with [employer].      H:    28-year-old male presents with alcohol intoxication.  Patient [place]  He got tired and laid down on the street.  Bystanders found him and called EMS.      PMH:  Past Medical History:   Diagnosis Date    Broken bone     collar        PSH:  Past Surgical History:   Procedure Laterality Date    APPENDECTOMY LAPAROSCOPIC N/A 6/3/2022    Procedure: APPENDECTOMY LAPAROSCOPIC;  Surgeon: Garry Goss DO;  Location: [facility];  Service: General         PE:   Vitals:    09/29/24 0334   BP: 163/90   BP Location: Right arm   Pulse: (!) 125   Resp: 16   Temp: 98.3 °F (36.8 °C)   TempSrc: Oral   SpO2: 96%         Constitutional: Vital signs are normal.  He appears well-developed. He is cooperative. No distress.   Pulmonary/Chest: Effort normal.   Abdominal: Normal appearance.   Neurological: He is alert.  Skin: Skin is warm, dry and intact.   Psychiatric: He has a normal mood and affect. His speech is normal and behavior is normal. Thought content normal.          - 13 point ROS was performed and all are normal unless stated in the history above.   - Nursing note reviewed. Vitals reviewed.   - Orders placed by myself and/or advanced practitioner / resident.    - Previous chart was reviewed  - No language barrier.   - History obtained from patient.   - There are no limitations to the history obtained. Reasons ROS could not be obtained:  N/A         Medications - No data to display  No orders to display     No orders of the defined types were placed in this encounter.    Labs Reviewed - No data to display  Time reflects when diagnosis was documented in both MDM as applicable and the Disposition within this note       Time User Action Codes Description Comment    9/29/2024  4:01 AM Caleb Herrera [F10.929] Alcohol intoxication (HCC)           ED Disposition       ED Disposition   Discharge    Condition   Stable    Date/Time   Sun Sep 29, 2024  4:01 AM    Comment   Brandon Dukes discharge to home/self care.                   Follow-up Information    None       Patient's Medications   Discharge Prescriptions    No medications on file     No discharge procedures on file.  Prior to Admission Medications   Prescriptions Last Dose Informant Patient Reported? Taking?   Multiple Vitamin (MULTIVITAMIN) tablet  Self Yes No   Sig: Take 1 tablet by mouth daily   ibuprofen (MOTRIN) 800 mg tablet   No No   Sig: Take 1 tablet (800 mg total) by mouth every 8 (eight) hours as needed for moderate pain for up to 14 days   methocarbamol (ROBAXIN) 750 mg tablet   No No   Sig: Take 1 tablet (750 mg total) by mouth every 6 (six) hours for 25 doses      Facility-Administered Medications:  "None       Portions of the record may have been created with voice recognition software. Occasional wrong word or \"sound a like\" substitutions may have occurred due to the inherent limitations of voice recognition software. Read the chart carefully and recognize, using context, where substitutions have occurred.         ED Course         Critical Care Time  Procedures      "

## 2024-09-29 NOTE — ED PROVIDER NOTES
Final diagnoses:   Alcohol intoxication (HCC)     ED Disposition       ED Disposition   Discharge    Condition   Stable    Date/Time   Sun Sep 29, 2024  4:01 AM    Comment   Brandon Dukes discharge to home/self care.                   Assessment & Plan       Medical Decision Making  ASSESSMENT: Patient is a 28 y.o. male who presents with alcohol intoxication.  Patient does not display any signs of trauma, head injury.  Patient is actively sobering up, and is calm and cooperative.  Patient's physical exam is benign.  Patient observed to ambulate without difficulty.  Patient was provided counseling on alcohol consumption and safe practices.  Patient is understanding.  Patient is stable for discharge.             Medications - No data to display    ED Risk Strat Scores                           SBIRT 22yo+      Flowsheet Row Most Recent Value   Initial Alcohol Screen: US AUDIT-C     1. How often do you have a drink containing alcohol? 0 Filed at: 09/29/2024 0406   2. How many drinks containing alcohol do you have on a typical day you are drinking?  0 Filed at: 09/29/2024 0406   3a. Male UNDER 65: How often do you have five or more drinks on one occasion? 0 Filed at: 09/29/2024 0406   3b. FEMALE Any Age, or MALE 65+: How often do you have 4 or more drinks on one occassion? 0 Filed at: 09/29/2024 0406   Audit-C Score 0 Filed at: 09/29/2024 0406   ZO: How many times in the past year have you...    Used an illegal drug or used a prescription medication for non-medical reasons? Never Filed at: 09/29/2024 0406                            History of Present Illness       Chief Complaint   Patient presents with    Alcohol Intoxication     Pt was found laying in the street by bystanders who thought he was dead and called 911. Police determined pt to be ETOH intoxicated and was brought in by EMS. Pt has no complaints or injuries at this time       Past Medical History:   Diagnosis Date    Broken bone     collar       Past  "Surgical History:   Procedure Laterality Date    APPENDECTOMY LAPAROSCOPIC N/A 6/3/2022    Procedure: APPENDECTOMY LAPAROSCOPIC;  Surgeon: Garry Goss DO;  Location: [facility];  Service: General      Family History   Problem Relation Age of Onset    Diabetes Paternal Grandmother     Asthma Paternal Grandmother     Asthma Paternal Aunt       Social History     Tobacco Use    Smoking status: Never    Smokeless tobacco: Never   Substance Use Topics    Alcohol use: Yes     Comment: Social    Drug use: Never      E-Cigarette/Vaping      E-Cigarette/Vaping Substances      I have reviewed and agree with the history as documented.     Patient is a 28-year-old male with no pertinent past medical history is presenting to the emergency department via EMS for alcohol intoxication.  Per EMS, patient was found by bystander lying on the side of the street asleep.  Patient states that he [place] to his [place] when he \"ran out of energy\".  Patient endorses drinking multiple alcoholic beverages during the day.  He denies any toxic alcohol ingestion, THC usage, cocaine usage, methamphetamine usage, other substance abuse.  He denies head strike, loss of consciousness, any other trauma.  He denies headache, vision changes, nausea, vomiting, diarrhea, abdominal pain, neck pain, arm pain, sensorimotor deficits, paresthesias.      Alcohol Intoxication      Review of Systems        Objective       ED Triage Vitals [09/29/24 0334]   Temperature Pulse Blood Pressure Respirations SpO2 Patient Position - Orthostatic VS   98.3 °F (36.8 °C) (!) 125 163/90 16 96 % Sitting      Temp Source Heart Rate Source BP Location FiO2 (%) Pain Score    Oral -- Right arm -- --      Vitals      Date and Time Temp Pulse SpO2 Resp BP Pain Score FACES Pain Rating User   09/29/24 0334 98.3 °F (36.8 °C) 125 96 % 16 163/90 -- -- [facility]            Physical Exam  Vitals reviewed.   Constitutional:       General: He is not in acute " distress.     Appearance: He is normal weight. He is not toxic-appearing or diaphoretic.   HENT:      Head: Normocephalic and atraumatic.      Nose: Nose normal.      Mouth/Throat:      Mouth: Mucous membranes are moist.      Pharynx: Oropharynx is clear. No oropharyngeal exudate or posterior oropharyngeal erythema.   Eyes:      Extraocular Movements: Extraocular movements intact.      Conjunctiva/sclera: Conjunctivae normal.      Pupils: Pupils are equal, round, and reactive to light.   Cardiovascular:      Rate and Rhythm: Regular rhythm. Tachycardia present.      Pulses: Normal pulses.      Heart sounds: Normal heart sounds.   Pulmonary:      Effort: Pulmonary effort is normal.      Breath sounds: Normal breath sounds. No wheezing.   Abdominal:      General: Abdomen is flat. Bowel sounds are normal. There is no distension.      Palpations: Abdomen is soft.      Tenderness: There is no abdominal tenderness. There is no guarding or rebound.   Musculoskeletal:         General: No swelling. Normal range of motion.      Cervical back: Normal range of motion. No tenderness.      Right lower leg: No edema.      Left lower leg: No edema.   Skin:     General: Skin is warm and dry.      Capillary Refill: Capillary refill takes less than 2 seconds.   Neurological:      General: No focal deficit present.      Mental Status: He is alert and oriented to person, place, and time.      Cranial Nerves: No cranial nerve deficit.      Sensory: No sensory deficit.      Motor: No weakness.   Psychiatric:         Mood and Affect: Mood normal.      Comments: Calm and cooperative         Results Reviewed       None            No orders to display       Procedures    ED Medication and Procedure Management   Prior to Admission Medications   Prescriptions Last Dose Informant Patient Reported? Taking?   Multiple Vitamin (MULTIVITAMIN) tablet  Self Yes No   Sig: Take 1 tablet by mouth daily   ibuprofen (MOTRIN) 800 mg tablet   No No   Sig:  Take 1 tablet (800 mg total) by mouth every 8 (eight) hours as needed for moderate pain for up to 14 days   methocarbamol (ROBAXIN) 750 mg tablet   No No   Sig: Take 1 tablet (750 mg total) by mouth every 6 (six) hours for 25 doses      Facility-Administered Medications: None     Patient's Medications   Discharge Prescriptions    No medications on file     No discharge procedures on file.  ED SEPSIS DOCUMENTATION   Time reflects when diagnosis was documented in both MDM as applicable and the Disposition within this note       Time User Action Codes Description Comment    9/29/2024  4:01 AM Caleb Herrera Add [F10.929] Alcohol intoxication (HCC)                  Caleb Herrera DO  09/29/24 0408

## 2024-10-23 ENCOUNTER — OFFICE VISIT (OUTPATIENT)
Dept: FAMILY MEDICINE CLINIC | Facility: CLINIC | Age: 29
End: 2024-10-23
Payer: COMMERCIAL

## 2024-10-23 VITALS
OXYGEN SATURATION: 100 % | HEART RATE: 74 BPM | RESPIRATION RATE: 18 BRPM | TEMPERATURE: 97.1 F | SYSTOLIC BLOOD PRESSURE: 124 MMHG | WEIGHT: 143.8 LBS | BODY MASS INDEX: 21.3 KG/M2 | HEIGHT: 69 IN | DIASTOLIC BLOOD PRESSURE: 78 MMHG

## 2024-10-23 DIAGNOSIS — Z13.1 SCREENING FOR DIABETES MELLITUS: ICD-10-CM

## 2024-10-23 DIAGNOSIS — M41.9 SCOLIOSIS OF THORACOLUMBAR SPINE, UNSPECIFIED SCOLIOSIS TYPE: ICD-10-CM

## 2024-10-23 DIAGNOSIS — E55.9 VITAMIN D DEFICIENCY: ICD-10-CM

## 2024-10-23 DIAGNOSIS — Z00.00 ANNUAL PHYSICAL EXAM: Primary | ICD-10-CM

## 2024-10-23 DIAGNOSIS — M25.571 RIGHT ANKLE PAIN, UNSPECIFIED CHRONICITY: ICD-10-CM

## 2024-10-23 PROCEDURE — 99395 PREV VISIT EST AGE 18-39: CPT | Performed by: FAMILY MEDICINE

## 2024-10-23 NOTE — PROGRESS NOTES
Adult Annual Physical  Name: Brandon Dukes      : 1995      MRN: [id number]  Encounter Provider: Yolanda Michele MD  Encounter Date: 10/23/2024   Encounter department: [facility]    Assessment & Plan  Annual physical exam  Regarding his annual physical patient is given age and diagnosis appropriate evaluation and care.  Patient is ordered blood work and urine today including screening test for diabetes as discussed with patient.  Patient declined the annual flu vaccine as well as the updated COVID-19 vaccine.  Patient encouraged to get it/them.  Patient will continue his multivitamin.  Also advised patient to take vitamin D D3 2000 units daily.  Advised patient to do monthly self testicular exam.  STI counseling etc.  Orders:    Lipid panel; Future    CBC and differential; Future    Comprehensive metabolic panel; Future    TSH, 3rd generation with Free T4 reflex; Future    UA w Reflex to Microscopic w Reflex to Culture; Future    Right ankle pain, unspecified chronicity  Discussed with patient.  Patient advised to ice it.  Prescribed Voltaren gel topical.  Patient request for physical therapy is done.  Patient sent for an x-ray.  And patient is referred to orthopedic.  Orders:    XR ankle 3+ vw right; Future    Diclofenac Sodium (VOLTAREN) 1 %; Apply 2 g topically 4 (four) times a day    Ambulatory Referral to Orthopedic Surgery; Future    Scoliosis of thoracolumbar spine, unspecified scoliosis type  Discussed with patient.  Patient education.  Patient without symptoms and was not aware of it as well.  Patient will go for the x-rays and will follow-up appropriately.  Orders:    XR spine thoracic 3 vw; Future    XR spine lumbar minimum 4 views non injury; Future    Vitamin D deficiency  Advised patient take vitamin D3 2000 units daily.  Check labs.  Orders:    Vitamin D 25 hydroxy; Future    Screening for diabetes mellitus  Discussed patient.  Orders:    Hemoglobin A1C;  Future      RTO 1 year for his annual physical and do the blood work and urine before patient can see me prior to that for anything else in between.      Immunizations and preventive care screenings were discussed with patient today. Appropriate education was printed on patient's after visit summary.    Counseling:  Alcohol/drug use: discussed moderation in alcohol intake, the recommendations for healthy alcohol use, and avoidance of illicit drug use.  Dental Health: discussed importance of regular tooth brushing, flossing, and dental visits.  Injury prevention: discussed safety/seat belts, safety helmets, smoke detectors, carbon monoxide detectors, and smoking near bedding or upholstery.  Sexual health: discussed sexually transmitted diseases, partner selection, use of condoms, avoidance of unintended pregnancy, and contraceptive alternatives.  Exercise: the importance of regular exercise/physical activity was discussed. Recommend exercise 3-5 times per week for at least 30 minutes.       Depression Screening and Follow-up Plan: Patient was screened for depression during today's encounter. They screened negative with a PHQ-2 score of 0.        History of Present Illness     Adult Annual Physical:  Patient presents for annual physical. 28-year-old male here for his annual physical.  Patient is concerned about his right ankle pain.  Patient sprained it in July.  Patient had gotten some physical therapy but now needs a renewal.  Patient denies tobacco alcohol or drugs.  Patient declined the annual flu vaccine as well as the updated COVID-19 vaccine.  Patient's last Tdap was in 2020.  No history of an adverse reaction to vaccines in the past..     Diet and Physical Activity:  - Diet/Nutrition: well balanced diet.  - Exercise: 1-2 times a week on average.    Depression Screening:  - PHQ-2 Score: 0    General Health:  - Sleep: sleeps well.  - Hearing: normal hearing bilateral ears.  - Vision: no vision problems.  -  "Dental: regular dental visits.     Health:  - History of STDs: no.   - Urinary symptoms: none.     Advanced Care Planning:  - Has an advanced directive?: no    - Has a durable medical POA?: no    - ACP document given to patient?: no      Review of Systems   Constitutional:  Negative for activity change, appetite change, chills, fatigue, fever and unexpected weight change.   HENT:  Negative for dental problem, hearing loss and sore throat.    Eyes:  Negative for visual disturbance.   Respiratory:  Negative for cough and shortness of breath.    Cardiovascular:  Negative for chest pain and palpitations.   Gastrointestinal:  Negative for abdominal pain, blood in stool, constipation, diarrhea, nausea and vomiting.   Genitourinary:  Negative for dysuria and hematuria.   Musculoskeletal:  Positive for arthralgias.   Skin:  Negative for rash.   Neurological:  Negative for dizziness and headaches.   Psychiatric/Behavioral:  Negative for dysphoric mood. The patient is not nervous/anxious.          Objective     /78 (BP Location: Left arm, Patient Position: Sitting, Cuff Size: Adult)   Pulse 74   Temp (!) 97.1 °F (36.2 °C) (Tympanic)   Resp 18   Ht 5' 9\" (1.753 m)   Wt 65.2 kg (143 lb 12.8 oz)   SpO2 100%   BMI 21.24 kg/m²     Physical Exam  Vitals reviewed.   Constitutional:       General: He is not in acute distress.     Appearance: Normal appearance. He is not ill-appearing.   HENT:      Head: Normocephalic and atraumatic.      Right Ear: Tympanic membrane, ear canal and external ear normal.      Left Ear: Tympanic membrane, ear canal and external ear normal.      Mouth/Throat:      Mouth: Mucous membranes are moist.      Pharynx: Oropharynx is clear.   Eyes:      Extraocular Movements: Extraocular movements intact.      Conjunctiva/sclera: Conjunctivae normal.   Neck:      Vascular: No carotid bruit.   Cardiovascular:      Rate and Rhythm: Normal rate and regular rhythm.   Pulmonary:      Effort: Pulmonary " effort is normal.      Breath sounds: Normal breath sounds.   Abdominal:      General: Bowel sounds are normal.      Palpations: Abdomen is soft.      Tenderness: There is no abdominal tenderness. There is no right CVA tenderness or left CVA tenderness.   Musculoskeletal:         General: No tenderness.      Right lower leg: No edema.      Left lower leg: No edema.      Comments: Mild dextroscoliosis in the upper lumbar spine.  As per my exam.    No calf tenderness bilateral.   Lymphadenopathy:      Cervical: No cervical adenopathy.   Skin:     General: Skin is warm.      Findings: No rash.   Neurological:      General: No focal deficit present.      Mental Status: He is alert and oriented to person, place, and time.   Psychiatric:         Mood and Affect: Mood normal.         Behavior: Behavior normal.         Thought Content: Thought content normal.

## 2024-10-23 NOTE — PATIENT INSTRUCTIONS
"Patient Education     Routine physical for adults   The Basics   Written by the doctors and editors at [facility]   What is a physical? -- A physical is a routine visit, or \"check-up,\" with your doctor. You might also hear it called a \"wellness visit\" or \"preventive visit.\"  During each visit, the doctor will:   Ask about your physical and mental health   Ask about your habits, behaviors, and lifestyle   Do an exam   Give you vaccines if needed   Talk to you about any medicines you take   Give advice about your health   Answer your questions  Getting regular check-ups is an important part of taking care of your health. It can help your doctor find and treat any problems you have. But it's also important for preventing health problems.  A routine physical is different from a \"sick visit.\" A sick visit is when you see a doctor because of a health concern or problem. Since physicals are scheduled ahead of time, you can think about what you want to ask the doctor.  How often should I get a physical? -- It depends on your age and health. In general, for people age 21 years and older:   If you are younger than 50 years, you might be able to get a physical every 3 years.   If you are 50 years or older, your doctor might recommend a physical every year.  If you have an ongoing health condition, like diabetes or high blood pressure, your doctor will probably want to see you more often.  What happens during a physical? -- In general, each visit will include:   Physical exam - The doctor or nurse will check your height, weight, heart rate, and blood pressure. They will also look at your eyes and ears. They will ask about how you are feeling and whether you have any symptoms that bother you.   Medicines - It's a good idea to bring a list of all the medicines you take to each doctor visit. Your doctor will talk to you about your medicines and answer any questions. Tell them if you are having any side effects that bother you. You " "should also tell them if you are having trouble paying for any of your medicines.   Habits and behaviors - This includes:   Your diet   Your exercise habits   Whether you smoke, drink alcohol, or use drugs   Whether you are sexually active   Whether you feel safe at home  Your doctor will talk to you about things you can do to improve your health and lower your risk of health problems. They will also offer help and support. For example, if you want to quit smoking, they can give you advice and might prescribe medicines. If you want to improve your diet or get more physical activity, they can help you with this, too.   Lab tests, if needed - The tests you get will depend on your age and situation. For example, your doctor might want to check your:   Cholesterol   Blood sugar   Iron level   Vaccines - The recommended vaccines will depend on your age, health, and what vaccines you already had. Vaccines are very important because they can prevent certain serious or deadly infections.   Discussion of screening - \"Screening\" means checking for diseases or other health problems before they cause symptoms. Your doctor can recommend screening based on your age, risk, and preferences. This might include tests to check for:   Cancer, such as breast, prostate, cervical, ovarian, colorectal, prostate, lung, or skin cancer   Sexually transmitted infections, such as chlamydia and gonorrhea   Mental health conditions like depression and anxiety  Your doctor will talk to you about the different types of screening tests. They can help you decide which screenings to have. They can also explain what the results might mean.   Answering questions - The physical is a good time to ask the doctor or nurse questions about your health. If needed, they can refer you to other doctors or specialists, too.  Adults older than 65 years often need other care, too. As you get older, your doctor will talk to you about:   How to prevent falling at " home   Hearing or vision tests   Memory testing   How to take your medicines safely   Making sure that you have the help and support you need at home  All topics are updated as new evidence becomes available and our peer review process is complete.  This topic retrieved from [employer] on: May 02, 2024.  Topic 884384 Version 1.0  Release: 32.4.3 - C32.122  © 2024 UpToDate[employer] Inc[employer] and/or its affiliates. All rights reserved.  Consumer Information Use and Disclaimer   Disclaimer: This generalized information is a limited summary of diagnosis, treatment, and/or medication information. It is not meant to be comprehensive and should be used as a tool to help the user understand and/or assess potential diagnostic and treatment options. It does NOT include all information about conditions, treatments, medications, side effects, or risks that may apply to a specific patient. It is not intended to be medical advice or a substitute for the medical advice, diagnosis, or treatment of a health care provider based on the health care provider's examination and assessment of a patient's specific and unique circumstances. Patients must speak with a health care provider for complete information about their health, medical questions, and treatment options, including any risks or benefits regarding use of medications. This information does not endorse any treatments or medications as safe, effective, or approved for treating a specific patient. Up[employer], Inc[employer] and its affiliates disclaim any warranty or liability relating to this information or the use thereof.The use of this information is governed by the Terms of Use, available at https://www.[employer]uwer.com/en/know/clinical-effectiveness-terms. 2024© UpToDate, Inc. and its affiliates and/or licensors. All rights reserved.  Copyright   © 2024 Up[employer]Date, Inc. and/or its affiliates. All rights reserved.

## 2024-10-23 NOTE — ASSESSMENT & PLAN NOTE
Regarding his annual physical patient is given age and diagnosis appropriate evaluation and care.  Patient is ordered blood work and urine today including screening test for diabetes as discussed with patient.  Patient declined the annual flu vaccine as well as the updated COVID-19 vaccine.  Patient encouraged to get it/them.  Patient will continue his multivitamin.  Also advised patient to take vitamin D D3 2000 units daily.  Advised patient to do monthly self testicular exam.  STI counseling etc.  Orders:    Lipid panel; Future    CBC and differential; Future    Comprehensive metabolic panel; Future    TSH, 3rd generation with Free T4 reflex; Future    UA w Reflex to Microscopic w Reflex to Culture; Future

## 2024-11-11 ENCOUNTER — OFFICE VISIT (OUTPATIENT)
Dept: OBGYN CLINIC | Facility: OTHER | Age: 29
End: 2024-11-11
Payer: COMMERCIAL

## 2024-11-11 VITALS
WEIGHT: 143 LBS | DIASTOLIC BLOOD PRESSURE: 68 MMHG | SYSTOLIC BLOOD PRESSURE: 108 MMHG | HEIGHT: 69 IN | BODY MASS INDEX: 21.18 KG/M2 | HEART RATE: 62 BPM

## 2024-11-11 DIAGNOSIS — R29.898 ANKLE WEAKNESS: ICD-10-CM

## 2024-11-11 DIAGNOSIS — G89.29 CHRONIC PAIN OF RIGHT ANKLE: Primary | ICD-10-CM

## 2024-11-11 DIAGNOSIS — S93.491S SPRAIN OF ANTERIOR TALOFIBULAR LIGAMENT OF RIGHT ANKLE, SEQUELA: ICD-10-CM

## 2024-11-11 DIAGNOSIS — M25.671 ANKLE STIFFNESS, RIGHT: ICD-10-CM

## 2024-11-11 DIAGNOSIS — M25.571 CHRONIC PAIN OF RIGHT ANKLE: Primary | ICD-10-CM

## 2024-11-11 PROCEDURE — 99203 OFFICE O/P NEW LOW 30 MIN: CPT | Performed by: PHYSICIAN ASSISTANT

## 2024-11-11 NOTE — PROGRESS NOTES
Orthopaedic Surgery - Office Note  Brandon Dukes (28 y.o. male)   : 1995   MRN: [id number]  Encounter Date: 2024    Chief Complaint   Patient presents with    Right Ankle - Pain         Assessment/Plan  Diagnoses and all orders for this visit:    Chronic pain of right ankle    Ankle weakness  -     Ambulatory Referral to Orthopedic Surgery    Ankle stiffness, right    Sprain of anterior talofibular ligament of right ankle, sequela    The diagnosis as well as treatment options were reviewed with the patient in the office today.  I explained he sprained his ankle in July and has not regained his full range of motion and strength causing his current symptoms.  I would recommend formal physical therapy to restore his range of motion and strength.  If his symptoms have not improved with restoring full range of motion and strength I would consider advanced imaging.  He may ice the ankle for comfort 20 minutes on 1 hour off 3 times a day.  He will refrain from running until he is symptom-free.  He was offered a lace up ankle support today for comfort and stability until he restores his full range of motion and strength with PT/home exercise program but he declines.  He may weight-bear as tolerated.  He will return in 3 to 4 weeks for repeat evaluation.     Return for Recheck with myself in 3-4 weeks..        History of Present Illness  This is a new patient with chronic right ankle pain.  He has had pain in the ankle since 2024.  He discussed the symptoms with his PCP on 10/23/2024.  He has had intermittent physical therapy but was advised he must get a prescription if he wants to continue.  Patient reports that at the time of injury his lateral ankle was significantly swollen.  He reports the injury occurred while running with an inversion type of injury.  He states that now standing for long periods of time at work he gets soreness in the lateral aspect of the ankle.  He reports it intermittently  "swells.  He denies any calf pain or instability.    Review of Systems  Pertinent items are noted in HPI.  All other systems were reviewed and are negative.    Physical Exam  /68 (BP Location: Left arm, Patient Position: Sitting, Cuff Size: Standard)   Pulse 62   Ht 5' 9\" (1.753 m)   Wt 64.9 kg (143 lb)   BMI 21.12 kg/m²   Cons: Appears well.  No apparent distress.  Psych: Alert. Oriented x3.  Mood and affect normal.    Right ankles with trace soft tissue swelling laterally.  He is tender on the distal fibula and anterior talofibular ligament.  He is nontender in the deltoid and CFL ligament.  He is nontender in the medial malleolus.  He is nontender in the anterior ankle, high ankle, and fibular head.  He is nontender midfoot and fifth metatarsal.  Dorsalis pedis and posterior tibial pulses are +2.  He has no instability to anterior drawer.  He has no tenderness in the calcaneus.  His gait is heel-to-toe.  Dorsiflexion and plantarflexion motion is maintained with 4+ out of 5 strength.  He has limited inversion and eversion range of motion with decreased ankle to 4- out of 5.  Gait is heel-to-toe.  He has a negative Hummel's test.  There is no calf tenderness and a negative Homans.  He is neuro vastly intact in the right ankle      [facility].  No acute fractures or dislocations are seen.  Mortise is intact.  X-rays were reviewed from orthopedic standpoint we will await official radiologist interpretation which was requested from facility.        Studies Reviewed   PCP notes were reviewed by myself in the office today.  Attempts were made to obtain records from [facility].  Will attempt to obtain physical therapy records.  Procedures  No procedures today.    Medical, Surgical, Family, and Social History  The patient's medical history, family history, and social history, were reviewed and updated as appropriate.    Past Medical History:   Diagnosis Date    " Broken bone     collar        Past Surgical History:   Procedure Laterality Date    APPENDECTOMY LAPAROSCOPIC N/A 6/3/2022    Procedure: APPENDECTOMY LAPAROSCOPIC;  Surgeon: Garry Goss DO;  Location: [facility];  Service: General       Family History   Problem Relation Age of Onset    Diabetes Paternal Grandmother     Asthma Paternal Grandmother     Asthma Paternal Aunt        Social History     Occupational History    Not on file   Tobacco Use    Smoking status: Never    Smokeless tobacco: Never   Substance and Sexual Activity    Alcohol use: Yes     Comment: Social    Drug use: Never    Sexual activity: Not on file       No Known Allergies      Current Outpatient Medications:     Diclofenac Sodium (VOLTAREN) 1 %, Apply 2 g topically 4 (four) times a day, Disp: 100 g, Rfl: 1    Multiple Vitamin (MULTIVITAMIN) tablet, Take 1 tablet by mouth daily, Disp: , Rfl:       Robby Wilkerson PA-C

## 2024-12-12 ENCOUNTER — OFFICE VISIT (OUTPATIENT)
Dept: OBGYN CLINIC | Facility: OTHER | Age: 29
End: 2024-12-12
Payer: COMMERCIAL

## 2024-12-12 VITALS
SYSTOLIC BLOOD PRESSURE: 126 MMHG | DIASTOLIC BLOOD PRESSURE: 77 MMHG | BODY MASS INDEX: 21.18 KG/M2 | HEIGHT: 69 IN | HEART RATE: 65 BPM | WEIGHT: 143 LBS

## 2024-12-12 DIAGNOSIS — R29.898 ANKLE WEAKNESS: ICD-10-CM

## 2024-12-12 DIAGNOSIS — S93.491S SPRAIN OF ANTERIOR TALOFIBULAR LIGAMENT OF RIGHT ANKLE, SEQUELA: Primary | ICD-10-CM

## 2024-12-12 DIAGNOSIS — G89.29 CHRONIC PAIN OF RIGHT ANKLE: ICD-10-CM

## 2024-12-12 DIAGNOSIS — M25.571 CHRONIC PAIN OF RIGHT ANKLE: ICD-10-CM

## 2024-12-12 DIAGNOSIS — M25.671 ANKLE STIFFNESS, RIGHT: ICD-10-CM

## 2024-12-12 PROCEDURE — 99213 OFFICE O/P EST LOW 20 MIN: CPT | Performed by: PHYSICIAN ASSISTANT

## 2024-12-12 NOTE — PROGRESS NOTES
Orthopaedic Surgery - Office Note  Brandon Dukes (29 y.o. male)   : 1995   MRN: [id number]  Encounter Date: 2024    Chief Complaint   Patient presents with    Right Ankle - Follow-up         Assessment/Plan  Diagnoses and all orders for this visit:    Sprain of anterior talofibular ligament of right ankle, sequela  -     Ambulatory Referral to Physical Therapy; Future    Ankle stiffness, right  -     Ambulatory Referral to Physical Therapy; Future    Ankle weakness  -     Ambulatory Referral to Physical Therapy; Future    Chronic pain of right ankle  -     Ambulatory Referral to Physical Therapy; Future    I reviewed with the patient in the office today as he has not attended formal physical therapy and on a consistent home exercise program I would not expect a significant improvement in his symptoms.  He has noted weakness and lack of motion on clinical exam and believe this is the source of his pain symptoms.  He would greatly benefit from a dedicated physical therapy and home exercise program to restore his normal motion and strength.  I reviewed if he still has symptoms after restoring normal motion and strength I would recommend an MRI to evaluate for internal derangement.  He expressed a verbal understanding and agrees to attend physical therapy and do the home exercise program.  He may ice the ankle for comfort 20 minutes on 1 hour off 3 times a day.  He may use Aleve 1 tablet twice daily with food stopping and calling if any stomach upset occurs.  He will call the office with any questions or concerns.     Return for Recheck in 4-5 weeks if not improved with PT.        History of Present Illness  This is a previous patient here for right ankle recheck.  He was injured in 2024 with an inversion type injury.  He had intermittent physical therapy and was seen by myself on 2024 and noted to have symptoms consistent with ankle pain, weakness, ATFL injury and physical therapy was  "recommended.  He was offered a lace up ankle support but declined.  There are no updated physical therapy notes for review.  Patient reports he has not gotten back into physical therapy since the last visit.  He reports intermittent home stretches and strengthening.  He reports he may feel little bit better but overall still notes pain and weakness in the right ankle.    Review of Systems  Pertinent items are noted in HPI.  All other systems were reviewed and are negative.    Physical Exam  /77   Pulse 65   Ht 5' 9\" (1.753 m)   Wt 64.9 kg (143 lb)   BMI 21.12 kg/m²   Cons: Appears well.  No apparent distress.  Psych: Alert. Oriented x3.  Mood and affect normal.    Right ankles with trace soft tissue swelling laterally.  He is tender on the distal fibula and anterior talofibular ligament.  He is nontender in the deltoid and CFL ligament.  He is nontender in the medial malleolus.  He is nontender in the anterior ankle, high ankle, and fibular head.  He is nontender midfoot and fifth metatarsal.  Dorsalis pedis and posterior tibial pulses are +2.  He has no instability to anterior drawer.  He has no tenderness in the calcaneus.  His gait is heel-to-toe.  Dorsiflexion and plantarflexion motion is maintained with 4+ out of 5 strength.  He has limited inversion and eversion range of motion with decreased ankle to 4- out of 5.  Gait is heel-to-toe.  He has a negative Hummel's test.  There is no calf tenderness and a negative Homans.  He is neuro vastly intact in the right ankle              Studies Reviewed    No significant findings.    END OF IMPRESSION:    INDICATION: Right foot pain.    TECHNIQUE:  AP, lateral and oblique projections of the right foot  were obtained.    COMPARISON: 1/6/14    FINDINGS:    The bones are normally aligned. No fracture or significant bony  lesion is identified.    There is no evidence of arthritis. The soft tissues appear  unremarkable.      This report was created using Voice " Recognition software. Thank you  for allowing us to participate in the care of your patient. If you  have any questions concerning this report please call the following  phone numbers: [facility] [phone]; [facility]  [facility] [phone]; [facility] [phone]  X-ray images as well as reports were reviewed by myself in the office today and I agree with radiologist interpretation.  There are no updated physical therapy notes to review.    Procedures  No procedures today.    Medical, Surgical, Family, and Social History  The patient's medical history, family history, and social history, were reviewed and updated as appropriate.    Past Medical History:   Diagnosis Date    Broken bone     collar        Past Surgical History:   Procedure Laterality Date    APPENDECTOMY LAPAROSCOPIC N/A 6/3/2022    Procedure: APPENDECTOMY LAPAROSCOPIC;  Surgeon: Garry Goss DO;  Location: [facility];  Service: General       Family History   Problem Relation Age of Onset    Diabetes Paternal Grandmother     Asthma Paternal Grandmother     Asthma Paternal Aunt        Social History     Occupational History    Not on file   Tobacco Use    Smoking status: Never    Smokeless tobacco: Never   Substance and Sexual Activity    Alcohol use: Yes     Comment: Social    Drug use: Never    Sexual activity: Not on file       No Known Allergies      Current Outpatient Medications:     Diclofenac Sodium (VOLTAREN) 1 %, Apply 2 g topically 4 (four) times a day, Disp: 100 g, Rfl: 1    Multiple Vitamin (MULTIVITAMIN) tablet, Take 1 tablet by mouth daily, Disp: , Rfl:       Robby Wilkerson PA-C

## 2024-12-17 NOTE — PROGRESS NOTES
PT Evaluation     Today's date: 2024  Patient name: Brandon Dukes  : 1995  MRN: [id number]  Referring provider: Maria Fernanda Pozo PT  Dx:   Encounter Diagnosis     ICD-10-CM    1. Sprain of anterior talofibular ligament of right ankle, subsequent encounter  S93.491D           Start Time: 1645  Stop Time: 1740  Total time in clinic (min): 55 minutes    Assessment    Assessment details: Brandon Dukes is a pleasant 29 y.o. male who presents was referred to PT following recent ATFL sprain. Imaging is negative for fracture. Upon evaluation he demonstrates decreased foot and ankle ROM, joint mobility and ankle strength which is resulting in chronic ankle impingement and ankle pain. Brandon would benefit from skilled PT to address these impairments in order to help regain normal functional mobility.           Goals  STG's to be achieved in 4 weeks:  1) Patient will demonstrate normal pain free LE ROM.   2) Patient will improve LE strength by 1/2 muscle grade.   3) Patient will report no greater than 2/10 ankle pain with descending stairs.     LTG's to be achieved in 8 weeks:  1) Patient will be independent and compliant with HEP.   2) Patient will report no greater than 2/10 ankle pain with return to running 5 miles or greater.   3) Patient will score 75 or greater on FOTO.      Subjective Evaluation    History of Present Illness  Mechanism of injury: Patient reports he injured his ankle while running in late July. He completed 1 month of PT and then took a break due to completing full direct access period. He was recently seen by ortho and received a referral to return to PT. He has less pain with driving. States he did attempt to return to run and would be tight and slow for the first mile. Patient states his goals are to avoid future injury. States his strength and balance have not yet normalized and he has fear of re-injury if he were to traverse uneven terrain.         Objective     Active Range of  "Motion   Left Ankle/Foot   Normal active range of motion  Great toe extension: 65 degrees     Right Ankle/Foot   Dorsiflexion (ke): 5 degrees   Plantar flexion: 40 degrees   Inversion: 10 degrees   Eversion: 5 degrees   Great toe extension: 40 degrees     Joint Play   Left Ankle/Foot  Joints within functional limits are the talocrural joint, subtalar joint, midfoot and forefoot.     Right Ankle/Foot  Joints within functional limits are the forefoot. Hypomobile in the talocrural joint, subtalar joint and midfoot.     Strength/Myotome Testing     Additional Strength Details  R Ankle: anterior tib-(5/5), posterior tib-(4+/5), fib longus/brevis-(4+/5), fib tertius-(4+/5)  L Ankle: DF- anterior tib-(5/5), posterior tib-(5/5), fib longus/brevis-(5/5), fib tertius-(5/5)  Foot:   R EHL: (4+/5), EDL (4+/5), FHL (4+/5), FDL (4+/5)  L EHL: (5/5) EDL (5/5), FHL (5/5), FDL (5/5)               Precautions: chronic ankle instability      Manuals 12/19            TCJ gr 4 post ES            Subtalar med/lat gr 4  ES            TCJ blocking tape  ES                         Neuro Re-Ed             Calf raise with eversion resist             Post tib calf raise on bosu             Lateral step over on bosu              SL RDL surge  x 10, add foam            Star slider 5 x            Flipped BOSU balance blazepod 30\" x 3                         Ther Ex             Self DF mob 10 x 10\"            Slant board stretch 2 x 30\" knee ext and knee flex                                                                                          Ther Activity                                       Gait Training                                       Modalities                                            "

## 2024-12-19 ENCOUNTER — EVALUATION (OUTPATIENT)
Dept: PHYSICAL THERAPY | Facility: OTHER | Age: 29
End: 2024-12-19
Payer: COMMERCIAL

## 2024-12-19 DIAGNOSIS — S93.491D SPRAIN OF ANTERIOR TALOFIBULAR LIGAMENT OF RIGHT ANKLE, SUBSEQUENT ENCOUNTER: Primary | ICD-10-CM

## 2024-12-19 PROCEDURE — 97164 PT RE-EVAL EST PLAN CARE: CPT | Performed by: PHYSICAL THERAPIST

## 2024-12-19 PROCEDURE — 97140 MANUAL THERAPY 1/> REGIONS: CPT | Performed by: PHYSICAL THERAPIST

## 2024-12-19 PROCEDURE — 97112 NEUROMUSCULAR REEDUCATION: CPT | Performed by: PHYSICAL THERAPIST

## 2024-12-19 PROCEDURE — 97110 THERAPEUTIC EXERCISES: CPT | Performed by: PHYSICAL THERAPIST

## 2025-01-02 ENCOUNTER — OFFICE VISIT (OUTPATIENT)
Dept: PHYSICAL THERAPY | Facility: OTHER | Age: 30
End: 2025-01-02
Payer: COMMERCIAL

## 2025-01-02 DIAGNOSIS — S93.491D SPRAIN OF ANTERIOR TALOFIBULAR LIGAMENT OF RIGHT ANKLE, SUBSEQUENT ENCOUNTER: Primary | ICD-10-CM

## 2025-01-02 PROCEDURE — 97110 THERAPEUTIC EXERCISES: CPT | Performed by: PEDIATRICS

## 2025-01-02 PROCEDURE — 97112 NEUROMUSCULAR REEDUCATION: CPT | Performed by: PEDIATRICS

## 2025-01-02 NOTE — PROGRESS NOTES
"Daily Note     Today's date: 2025  Patient name: Brandon Dukes  : 1995  MRN: [id number]  Referring provider: Maria Fernanda Pozo, GIANCARLO  Dx:   Encounter Diagnosis     ICD-10-CM    1. Sprain of anterior talofibular ligament of right ankle, subsequent encounter  S93.491D                      Subjective: Patient states he mostly feels uncertain about uneven surfaces. Has been avoiding uneven surfaces in fear of re-injuring ankle.      Objective: See treatment diary below      Assessment: Tolerated treatment well. Patient demonstrated fatigue post treatment, exhibited good technique with therapeutic exercises, and would benefit from continued PT Progressed several exercises which were appropriately challenging/fatiguing for patient. Did not add foam to RDL today as this was still fairly challenging for patient without foam. Will monitor response to today's session NV.       Plan: Continue per plan of care.      Precautions: chronic ankle instability      Manuals /           TCJ gr 4 post ES ES           Subtalar med/lat gr 4  ES ES           TCJ blocking tape  ES ES                        Neuro Re-Ed             Calf raise with eversion resist  Mtb  3x10           Post tib calf raise on bosu  20x           Lateral step over on bosu   2x20           SL RDL surge  x 10, add foam x10           Star slider 5 x 2x5           Flipped BOSU balance blazepod 30\" x 3 30\"x3                        Ther Ex             Self DF mob 10 x 10\" 10\"x10           Slant board stretch 2 x 30\" knee ext and knee flex 2 x 30\" knee ext and knee flex                                                                                         Ther Activity                                       Gait Training                                       Modalities                                            "

## 2025-01-07 ENCOUNTER — OFFICE VISIT (OUTPATIENT)
Dept: PHYSICAL THERAPY | Facility: OTHER | Age: 30
End: 2025-01-07
Payer: COMMERCIAL

## 2025-01-07 DIAGNOSIS — S93.491D SPRAIN OF ANTERIOR TALOFIBULAR LIGAMENT OF RIGHT ANKLE, SUBSEQUENT ENCOUNTER: Primary | ICD-10-CM

## 2025-01-07 PROCEDURE — 97110 THERAPEUTIC EXERCISES: CPT | Performed by: PEDIATRICS

## 2025-01-07 PROCEDURE — 97112 NEUROMUSCULAR REEDUCATION: CPT | Performed by: PEDIATRICS

## 2025-01-07 NOTE — PROGRESS NOTES
"Daily Note     Today's date: 2025  Patient name: Brandon Dukes  : 1995  MRN: [id number]  Referring provider: Maria Fernanda Pozo, PT  Dx:   Encounter Diagnosis     ICD-10-CM    1. Sprain of anterior talofibular ligament of right ankle, subsequent encounter  S93.491D                      Subjective: Patient states he was sore the night of PT but soreness was gone by next day.       Objective: See treatment diary below      Assessment: Tolerated treatment well. Patient demonstrated fatigue post treatment, exhibited good technique with therapeutic exercises, and would benefit from continued PT Progressed several exercises which were appropriately challenging/fatiguing for patient. Progression of a few exercises with notable increased challenge. Patient is doing well overall. Will continue to progress as able.       Plan: Continue per plan of care.      Precautions: chronic ankle instability      Manuals  1          TCJ gr 4 post ES ES ES          Subtalar med/lat gr 4  ES ES ES          TCJ blocking tape  ES ES                        Neuro Re-Ed             Calf raise with eversion resist  Mtb  3x10 Mtb  3x10          Post tib calf raise on bosu  20x 2x10          Lateral step over on bosu   2x20 2x20          SL RDL surge  x 10, add foam x10 X10  Green foam          Star slider 5 x 2x5 2x5          Flipped BOSU balance blazepod 30\" x 3 30\"x3 30\"x3          Lunges onto bosu   1x10          Ther Ex             Self DF mob 10 x 10\" 10\"x10 10\"x10          Slant board stretch 2 x 30\" knee ext and knee flex 2 x 30\" knee ext and knee flex 2 x 30\" knee ext and knee flex                                                                                        Ther Activity                                       Gait Training                                       Modalities                                            "

## 2025-01-09 ENCOUNTER — OFFICE VISIT (OUTPATIENT)
Dept: PHYSICAL THERAPY | Facility: OTHER | Age: 30
End: 2025-01-09
Payer: COMMERCIAL

## 2025-01-09 DIAGNOSIS — S93.491D SPRAIN OF ANTERIOR TALOFIBULAR LIGAMENT OF RIGHT ANKLE, SUBSEQUENT ENCOUNTER: Primary | ICD-10-CM

## 2025-01-09 PROCEDURE — 97112 NEUROMUSCULAR REEDUCATION: CPT | Performed by: PEDIATRICS

## 2025-01-09 PROCEDURE — 97110 THERAPEUTIC EXERCISES: CPT | Performed by: PEDIATRICS

## 2025-01-09 NOTE — PROGRESS NOTES
"Daily Note     Today's date: 2025  Patient name: Brandon Dukes  : 1995  MRN: [id number]  Referring provider: Maria Fernanda Pozo PT  Dx:   Encounter Diagnosis     ICD-10-CM    1. Sprain of anterior talofibular ligament of right ankle, subsequent encounter  S93.491D                      Subjective: Patient states he was sore the night of PT but soreness was gone by next day.       Objective: See treatment diary below      Assessment: Tolerated treatment well. Patient demonstrated fatigue post treatment, exhibited good technique with therapeutic exercises, and would benefit from continued PT Corrected lumbar rotation during SL RDL with surge. Patient was very challenged with maintaining neutral pelvis. Will continue to monitor during future sessions. May benefit from some core exercises during future sessions.       Plan: Continue per plan of care.      Precautions: chronic ankle instability      Manuals  1/         TCJ gr 4 post ES ES ES          Subtalar med/lat gr 4  ES ES ES          TCJ blocking tape  ES ES                        Neuro Re-Ed             Calf raise with eversion resist  Mtb  3x10 Mtb  3x10 Mtb  3x10         Post tib calf raise on bosu  20x 2x10 3x10         Lateral step over on bosu   2x20 2x20 2x20         SL RDL surge  x 10, add foam x10 X10  Green foam 3x5  Green foam         Star slider 5 x 2x5 2x5 2x5         Flipped BOSU balance blazepod 30\" x 3 30\"x3 30\"x3 30\"x3         Lunges onto bosu   1x10 3x10         Ther Ex             Self DF mob 10 x 10\" 10\"x10 10\"x10 10\"x10         Slant board stretch 2 x 30\" knee ext and knee flex 2 x 30\" knee ext and knee flex 2 x 30\" knee ext and knee flex 2 x 30\" knee ext and knee flex                                                                                       Ther Activity                                       Gait Training                                       Modalities                                            "

## 2025-01-14 ENCOUNTER — OFFICE VISIT (OUTPATIENT)
Dept: PHYSICAL THERAPY | Facility: OTHER | Age: 30
End: 2025-01-14
Payer: COMMERCIAL

## 2025-01-14 DIAGNOSIS — S93.491D SPRAIN OF ANTERIOR TALOFIBULAR LIGAMENT OF RIGHT ANKLE, SUBSEQUENT ENCOUNTER: Primary | ICD-10-CM

## 2025-01-14 PROCEDURE — 97530 THERAPEUTIC ACTIVITIES: CPT | Performed by: PHYSICAL THERAPIST

## 2025-01-14 PROCEDURE — 97112 NEUROMUSCULAR REEDUCATION: CPT | Performed by: PHYSICAL THERAPIST

## 2025-01-14 PROCEDURE — 97110 THERAPEUTIC EXERCISES: CPT | Performed by: PHYSICAL THERAPIST

## 2025-01-14 NOTE — PROGRESS NOTES
"Daily Note     Today's date: 2025  Patient name: Brandon Dukes  : 1995  MRN: [id number]  Referring provider: Maria Fernanda Pozo, PT  Dx:   Encounter Diagnosis     ICD-10-CM    1. Sprain of anterior talofibular ligament of right ankle, subsequent encounter  S93.491D             Start Time: 1745  Stop Time: 1840  Total time in clinic (min): 55 minutes    Subjective: Patient reports continued gradual progress in ankle stability with current program.       Objective: See treatment diary below      Assessment: Tolerated treatment well. Patient demonstrated fatigue post treatment, exhibited good technique with therapeutic exercises, and would benefit from continued PT Progressed balance activity adding light explosive SL activity in preparation for return to run.     Plan: Continue per plan of care.      Precautions: chronic ankle instability      Manuals         TCJ gr 4 post ES ES ES          Subtalar med/lat gr 4  ES ES ES          TCJ blocking tape  ES ES                        Neuro Re-Ed             Calf raise with eversion resist  Mtb  3x10 Mtb  3x10 Mtb  3x10 MTB 3 x 10        Post tib calf raise on bosu  20x 2x10 3x10         Lateral step over on bosu   2x20 2x20 2x20 Quick 3 x 10        SL RDL surge  x 10, add foam x10 X10  Green foam 3x5  Green foam 3 x 5 blue surge        Star slider 5 x 2x5 2x5 2x5         Flipped BOSU balance blazepod 30\" x 3 30\"x3 30\"x3 30\"x3 45\" x 3        Lunges onto bosu   1x10 3x10 3 x 10        Sidestep beam balance, theraband resistance     3 way plum 4 laps ea        Ther Ex             curve     5'        Self DF mob 10 x 10\" 10\"x10 10\"x10 10\"x10 10 x 10\"        Slant board stretch 2 x 30\" knee ext and knee flex 2 x 30\" knee ext and knee flex 2 x 30\" knee ext and knee flex 2 x 30\" knee ext and knee flex 2 x 30\" ea                                                                                      Ther Activity             Skater hops     2 x 10   "                   Gait Training                                       Modalities

## 2025-01-16 ENCOUNTER — APPOINTMENT (OUTPATIENT)
Dept: PHYSICAL THERAPY | Facility: OTHER | Age: 30
End: 2025-01-16
Payer: COMMERCIAL

## 2025-01-21 ENCOUNTER — OFFICE VISIT (OUTPATIENT)
Dept: PHYSICAL THERAPY | Facility: OTHER | Age: 30
End: 2025-01-21
Payer: COMMERCIAL

## 2025-01-21 DIAGNOSIS — S93.491D SPRAIN OF ANTERIOR TALOFIBULAR LIGAMENT OF RIGHT ANKLE, SUBSEQUENT ENCOUNTER: Primary | ICD-10-CM

## 2025-01-21 PROCEDURE — 97112 NEUROMUSCULAR REEDUCATION: CPT | Performed by: PHYSICAL THERAPIST

## 2025-01-21 PROCEDURE — 97110 THERAPEUTIC EXERCISES: CPT | Performed by: PHYSICAL THERAPIST

## 2025-01-21 NOTE — PROGRESS NOTES
"Daily Note     Today's date: 2025  Patient name: Brandon Dukes  : 1995  MRN: [id number]  Referring provider: Maria Fernanda Pozo, PT  Dx:   Encounter Diagnosis     ICD-10-CM    1. Sprain of anterior talofibular ligament of right ankle, subsequent encounter  S93.491D                        1 on 1 from 099-034, not billed remainder  Subjective: Patient denies any soreness with last visits progression. Reports he did not run this weekend due to snow and fear of reinjury running in the snow.       Objective: See treatment diary below      Assessment: Tolerated treatment well. Patient demonstrated fatigue post treatment, exhibited good technique with therapeutic exercises, and would benefit from continued PT Progressed balance activity adding light explosive SL activity in preparation for return to run.     Plan: Continue per plan of care.      Precautions: chronic ankle instability      Manuals        TCJ gr 4 post ES ES ES          Subtalar med/lat gr 4  ES ES ES          TCJ blocking tape  ES ES                        Neuro Re-Ed             Calf raise with eversion resist  Mtb  3x10 Mtb  3x10 Mtb  3x10 MTB 3 x 10        Post tib calf raise on bosu  20x 2x10 3x10         Lateral step over on bosu   2x20 2x20 2x20 Quick 3 x 10 Quick 3 x 10       SL RDL surge  x 10, add foam x10 X10  Green foam 3x5  Green foam 3 x 5 blue surge 3 x 10 blue foam, surge       Star slider 5 x 2x5 2x5 2x5         Flipped BOSU balance blazepod 30\" x 3 30\"x3 30\"x3 30\"x3 45\" x 3 30\" x 3       Lunges onto bosu   1x10 3x10 3 x 10        Sidestep beam balance, theraband resistance     3 way plum 4 laps ea 3 way plum       Ther Ex             curve     5'        Self DF mob 10 x 10\" 10\"x10 10\"x10 10\"x10 10 x 10\"        Slant board stretch 2 x 30\" knee ext and knee flex 2 x 30\" knee ext and knee flex 2 x 30\" knee ext and knee flex 2 x 30\" knee ext and knee flex 2 x 30\" ea 2 x 30\" ea                             "                                                         Ther Activity             Skater hops     2 x 10 2 x 10       Resistance band catch blazepod      3 x        Gait Training                                       Modalities

## 2025-01-22 NOTE — PROGRESS NOTES
"Daily Note     Today's date: 2025  Patient name: Brandon Dukes  : 1995  MRN: [id number]  Referring provider: Maria Fernanda Pozo, PT  Dx:   No diagnosis found.                   1 on 1 from [id number], not billed remainder  Subjective: Patient denies any soreness with last visits progression. Reports he did not run this weekend due to snow and fear of reinjury running in the snow.       Objective: See treatment diary below      Assessment: Tolerated treatment well. Patient demonstrated fatigue post treatment, exhibited good technique with therapeutic exercises, and would benefit from continued PT Progressed balance activity adding light explosive SL activity in preparation for return to run.     Plan: Continue per plan of care.      Precautions: chronic ankle instability      Manuals        TCJ gr 4 post ES ES ES          Subtalar med/lat gr 4  ES ES ES          TCJ blocking tape  ES ES                        Neuro Re-Ed             Calf raise with eversion resist  Mtb  3x10 Mtb  3x10 Mtb  3x10 MTB 3 x 10        Post tib calf raise on bosu  20x 2x10 3x10         Lateral step over on bosu   2x20 2x20 2x20 Quick 3 x 10 Quick 3 x 10       SL RDL surge  x 10, add foam x10 X10  Green foam 3x5  Green foam 3 x 5 blue surge 3 x 10 blue foam, surge       Star slider 5 x 2x5 2x5 2x5         Flipped BOSU balance blazepod 30\" x 3 30\"x3 30\"x3 30\"x3 45\" x 3 30\" x 3       Lunges onto bosu   1x10 3x10 3 x 10        Sidestep beam balance, theraband resistance     3 way plum 4 laps ea 3 way plum       Ther Ex             curve     5'        Self DF mob 10 x 10\" 10\"x10 10\"x10 10\"x10 10 x 10\"        Slant board stretch 2 x 30\" knee ext and knee flex 2 x 30\" knee ext and knee flex 2 x 30\" knee ext and knee flex 2 x 30\" knee ext and knee flex 2 x 30\" ea 2 x 30\" ea                                                                                     Ther Activity             Skater hops     2 x 10 2 x 10 "       Resistance band catch deon      3 x        Gait Training                                       Modalities

## 2025-01-23 ENCOUNTER — APPOINTMENT (OUTPATIENT)
Dept: PHYSICAL THERAPY | Facility: OTHER | Age: 30
End: 2025-01-23
Payer: COMMERCIAL

## (undated) DEVICE — TROCAR: Brand: KII® SLEEVE

## (undated) DEVICE — TROCAR: Brand: KII FIOS FIRST ENTRY

## (undated) DEVICE — ADHESIVE SKIN HIGH VISCOSITY EXOFIN 1ML

## (undated) DEVICE — HARMONIC ACE 5MM DIAMETER SHEARS 36CM SHAFT LENGTH + ADAPTIVE TISSUE TECHNOLOGY FOR USE WITH GENERATOR G11: Brand: HARMONIC ACE

## (undated) DEVICE — TISSUE RETRIEVAL SYSTEM: Brand: INZII RETRIEVAL SYSTEM

## (undated) DEVICE — CHLORAPREP HI-LITE 26ML ORANGE

## (undated) DEVICE — INTENDED FOR TISSUE SEPARATION, AND OTHER PROCEDURES THAT REQUIRE A SHARP SURGICAL BLADE TO PUNCTURE OR CUT.: Brand: BARD-PARKER SAFETY BLADES SIZE 11, STERILE

## (undated) DEVICE — IRRIG ENDO FLO TUBING

## (undated) DEVICE — GLOVE SRG BIOGEL 7

## (undated) DEVICE — SUT VICRYL PLUS 0 UR-6 27IN VCP603H

## (undated) DEVICE — SUT MONOCRYL 4-0 PS-2 18 IN Y496G

## (undated) DEVICE — PDS II VLT 0 107CM AG ST3: Brand: ENDOLOOP

## (undated) DEVICE — PACK PBDS LAP CHOLE RF

## (undated) DEVICE — GLOVE INDICATOR PI UNDERGLOVE SZ 7.5 BLUE

## (undated) DEVICE — TRAY FOLEY 16FR URIMETER SURESTEP